# Patient Record
Sex: MALE | Race: WHITE | NOT HISPANIC OR LATINO
[De-identification: names, ages, dates, MRNs, and addresses within clinical notes are randomized per-mention and may not be internally consistent; named-entity substitution may affect disease eponyms.]

---

## 2018-05-03 ENCOUNTER — RX RENEWAL (OUTPATIENT)
Age: 63
End: 2018-05-03

## 2018-05-03 PROBLEM — Z00.00 ENCOUNTER FOR PREVENTIVE HEALTH EXAMINATION: Status: ACTIVE | Noted: 2018-05-03

## 2018-05-06 ENCOUNTER — RX RENEWAL (OUTPATIENT)
Age: 63
End: 2018-05-06

## 2018-05-14 ENCOUNTER — RECORD ABSTRACTING (OUTPATIENT)
Age: 63
End: 2018-05-14

## 2018-05-14 DIAGNOSIS — H26.9 UNSPECIFIED CATARACT: ICD-10-CM

## 2018-05-16 ENCOUNTER — APPOINTMENT (OUTPATIENT)
Dept: FAMILY MEDICINE | Facility: CLINIC | Age: 63
End: 2018-05-16
Payer: COMMERCIAL

## 2018-05-16 ENCOUNTER — LABORATORY RESULT (OUTPATIENT)
Age: 63
End: 2018-05-16

## 2018-05-16 VITALS
DIASTOLIC BLOOD PRESSURE: 72 MMHG | BODY MASS INDEX: 31.35 KG/M2 | WEIGHT: 219 LBS | OXYGEN SATURATION: 98 % | RESPIRATION RATE: 14 BRPM | HEART RATE: 78 BPM | SYSTOLIC BLOOD PRESSURE: 120 MMHG | HEIGHT: 70 IN

## 2018-05-16 LAB — PODIATRY EVAL: NORMAL

## 2018-05-16 PROCEDURE — 36415 COLL VENOUS BLD VENIPUNCTURE: CPT

## 2018-05-16 PROCEDURE — 99214 OFFICE O/P EST MOD 30 MIN: CPT | Mod: 25

## 2018-05-16 NOTE — ASSESSMENT
[FreeTextEntry1] : Medications and diet reviewed. Continue proactive approach to health.  Annual Ophthalmology exam. Follow up Podiatry. Follow up labs every three months.\par Basic cardiovascular prevention measures are advised including regular exercise, surveillance medical examination, and prudent portion-controlled low fat diet, rich in a variety of vegetables with minimal added sugars, refined starches, and no artificially hydrogenated oils.\par

## 2018-05-16 NOTE — PHYSICAL EXAM
[Normal Rhythm/Effort] : normal respiratory rhythm and effort [Clear Bilaterally] : the lungs were clear to auscultation bilaterally [Normal to Percussion] : the lungs were normal to percussion [Normal] : palpation of the chest was normal [No Acute Distress] : no acute distress [Well Nourished] : well nourished [Well Developed] : well developed [Well-Appearing] : well-appearing [Normal Sclera/Conjunctiva] : normal sclera/conjunctiva [PERRL] : pupils equal round and reactive to light [EOMI] : extraocular movements intact [Normal Outer Ear/Nose] : the outer ears and nose were normal in appearance [Normal Oropharynx] : the oropharynx was normal [No JVD] : no jugular venous distention [Supple] : supple [No Lymphadenopathy] : no lymphadenopathy [Thyroid Normal, No Nodules] : the thyroid was normal and there were no nodules present [No Respiratory Distress] : no respiratory distress  [Clear to Auscultation] : lungs were clear to auscultation bilaterally [No Accessory Muscle Use] : no accessory muscle use [Normal Rate] : normal rate  [Regular Rhythm] : with a regular rhythm [Normal S1, S2] : normal S1 and S2 [No Murmur] : no murmur heard [No Carotid Bruits] : no carotid bruits [No Abdominal Bruit] : a ~M bruit was not heard ~T in the abdomen [No Varicosities] : no varicosities [Pedal Pulses Present] : the pedal pulses are present [No Edema] : there was no peripheral edema [No Extremity Clubbing/Cyanosis] : no extremity clubbing/cyanosis [No Palpable Aorta] : no palpable aorta [Soft] : abdomen soft [Non Tender] : non-tender [Non-distended] : non-distended [No Masses] : no abdominal mass palpated [No HSM] : no HSM [Normal Bowel Sounds] : normal bowel sounds [Normal Posterior Cervical Nodes] : no posterior cervical lymphadenopathy [Normal Anterior Cervical Nodes] : no anterior cervical lymphadenopathy [No CVA Tenderness] : no CVA  tenderness [No Spinal Tenderness] : no spinal tenderness [No Joint Swelling] : no joint swelling [Grossly Normal Strength/Tone] : grossly normal strength/tone [No Rash] : no rash [Normal Gait] : normal gait [Coordination Grossly Intact] : coordination grossly intact [No Focal Deficits] : no focal deficits [Deep Tendon Reflexes (DTR)] : deep tendon reflexes were 2+ and symmetric [Normal Affect] : the affect was normal [Normal Insight/Judgement] : insight and judgment were intact

## 2018-05-16 NOTE — HISTORY OF PRESENT ILLNESS
[FreeTextEntry1] : med check [de-identified] : 61 yo wm here for follow up on diabetes, hyperlipidemia,He is compliant with diet and exercise. Unfortunately I might go through a divorce.  There is tremendous stress. Her mom was ill. I was taking care of her kids then my mother  and her mother  and then she moved out. we are both going to therapy  WE have known each other for 27 years. My therapist thinks I should move on but my wife came back.

## 2018-05-17 LAB
ALBUMIN SERPL ELPH-MCNC: 4.8 G/DL
ALP BLD-CCNC: 33 U/L
ALT SERPL-CCNC: 29 U/L
ANION GAP SERPL CALC-SCNC: 18 MMOL/L
AST SERPL-CCNC: 22 U/L
BASOPHILS # BLD AUTO: 0.09 K/UL
BASOPHILS NFR BLD AUTO: 1.2 %
BILIRUB SERPL-MCNC: 0.7 MG/DL
BUN SERPL-MCNC: 18 MG/DL
CALCIUM SERPL-MCNC: 10.5 MG/DL
CHLORIDE SERPL-SCNC: 102 MMOL/L
CHOLEST SERPL-MCNC: 176 MG/DL
CHOLEST/HDLC SERPL: 3.9 RATIO
CO2 SERPL-SCNC: 22 MMOL/L
CREAT SERPL-MCNC: 1.24 MG/DL
CREAT SPEC-SCNC: 189 MG/DL
EOSINOPHIL # BLD AUTO: 0.25 K/UL
EOSINOPHIL NFR BLD AUTO: 3.4 %
FRUCTOSAMINE SERPL-MCNC: 269 UMOL/L
GLUCOSE SERPL-MCNC: 138 MG/DL
HBA1C MFR BLD HPLC: 6.7 %
HCT VFR BLD CALC: 40.9 %
HCV AB SER QL: NONREACTIVE
HCV S/CO RATIO: 0.15 S/CO
HDLC SERPL-MCNC: 45 MG/DL
HGB BLD-MCNC: 13.4 G/DL
IMM GRANULOCYTES NFR BLD AUTO: 1.2 %
IRON SERPL-MCNC: 116 UG/DL
LDLC SERPL CALC-MCNC: 101 MG/DL
LYMPHOCYTES # BLD AUTO: 2 K/UL
LYMPHOCYTES NFR BLD AUTO: 26.8 %
MAN DIFF?: NORMAL
MCHC RBC-ENTMCNC: 30.2 PG
MCHC RBC-ENTMCNC: 32.8 GM/DL
MCV RBC AUTO: 92.3 FL
MICROALBUMIN 24H UR DL<=1MG/L-MCNC: 1.2 MG/DL
MICROALBUMIN/CREAT 24H UR-RTO: 6 MG/G
MONOCYTES # BLD AUTO: 0.54 K/UL
MONOCYTES NFR BLD AUTO: 7.2 %
NEUTROPHILS # BLD AUTO: 4.48 K/UL
NEUTROPHILS NFR BLD AUTO: 60.2 %
PLATELET # BLD AUTO: 255 K/UL
POTASSIUM SERPL-SCNC: 5.3 MMOL/L
PROT SERPL-MCNC: 8 G/DL
PSA SERPL-MCNC: 0.45 NG/ML
RBC # BLD: 4.43 M/UL
RBC # FLD: 13.7 %
SODIUM SERPL-SCNC: 142 MMOL/L
TRIGL SERPL-MCNC: 149 MG/DL
TSH SERPL-ACNC: 1.5 UIU/ML
WBC # FLD AUTO: 7.45 K/UL

## 2018-05-21 LAB — HIV1+2 AB SPEC QL IA.RAPID: NONREACTIVE

## 2018-06-04 ENCOUNTER — APPOINTMENT (OUTPATIENT)
Dept: FAMILY MEDICINE | Facility: CLINIC | Age: 63
End: 2018-06-04

## 2018-12-11 ENCOUNTER — APPOINTMENT (OUTPATIENT)
Dept: FAMILY MEDICINE | Facility: CLINIC | Age: 63
End: 2018-12-11
Payer: COMMERCIAL

## 2018-12-11 VITALS
HEART RATE: 68 BPM | SYSTOLIC BLOOD PRESSURE: 120 MMHG | DIASTOLIC BLOOD PRESSURE: 70 MMHG | RESPIRATION RATE: 12 BRPM | OXYGEN SATURATION: 98 %

## 2018-12-11 PROCEDURE — 93000 ELECTROCARDIOGRAM COMPLETE: CPT

## 2018-12-11 PROCEDURE — 99214 OFFICE O/P EST MOD 30 MIN: CPT | Mod: 25

## 2018-12-11 PROCEDURE — 36415 COLL VENOUS BLD VENIPUNCTURE: CPT

## 2018-12-11 NOTE — HEALTH RISK ASSESSMENT
[No falls in past year] : Patient reported no falls in the past year [] : No [de-identified] : n0 [de-identified] : Dr Suarez

## 2018-12-11 NOTE — HISTORY OF PRESENT ILLNESS
[FreeTextEntry8] : pt c/o right elbow +pain -injury +radiates  down arm in pinky x 1 month  My pinky is numb. I do a lot of leaning on it. It is better by Sunday and then it gets bad by the week day again. I do a lot of carrying and moving furniture.  I am moving\par pt c/o heart burn +at night  x 3 months Over the last few months i have worse heartburn it depends on what I eat. I cant have tomato the later in the day the worse it is . if I lay down it is bad. If I stand up it is better. I try zantac and it helps. I am trying to stay away from spicy but it is horrible   I have been taking advil for my elbow.

## 2018-12-11 NOTE — REVIEW OF SYSTEMS
[Fatigue] : fatigue [Heartburn] : heartburn [Joint Pain] : joint pain [Joint Stiffness] : joint stiffness [Muscle Pain] : muscle pain [Negative] : Respiratory

## 2018-12-11 NOTE — PHYSICAL EXAM
[No Acute Distress] : no acute distress [Well Nourished] : well nourished [Well Developed] : well developed [Normal Rhythm/Effort] : normal respiratory rhythm and effort [Clear Bilaterally] : the lungs were clear to auscultation bilaterally [Normal to Percussion] : the lungs were normal to percussion [Normal Rate] : normal [Normal S1] : normal S1 [Normal S2] : normal S2 [No Murmur] : no murmurs heard [No Pitting Edema] : no pitting edema present [2+] : left 2+ [No Abnormalities] : the abdominal aorta was not enlarged and no bruit was heard [Normal] : normal [Soft, Nontender] : the abdomen was soft and nontender [No Mass] : no masses were palpated [No HSM] : no hepatosplenomegaly noted [S3] : no S3 [S4] : no S4 [Right Carotid Bruit] : no bruit heard over the right carotid [Left Carotid Bruit] : no bruit heard over the left carotid [Right Femoral Bruit] : no bruit heard over the right femoral artery [Left Femoral Bruit] : no bruit heard over the left femoral artery [de-identified] : right medial epicondyle tender forearm tender along ulnar nerve burning

## 2018-12-11 NOTE — ASSESSMENT
[FreeTextEntry1] : check ekg\par trial protonix for gerd\par endoscopy if no better\par avoid irritating foods,etoh, caffeine. advil\par \par check xray elbow and then MRI right elbow ro nerve entrapment \par he may need orthopedic follow up\par continue to wear brace and trial prednisone for inflammation take with food. ice on and off for 20 minutes. Look at desk at work and take the pressure off his elbow and forearm\par Labs drawn/ specimens obtained  in office on this date of service  for evaluation of   assessed conditions - hgba1c     to be run at [facility].\par

## 2018-12-13 LAB
ALBUMIN SERPL ELPH-MCNC: 4.8 G/DL
ALP BLD-CCNC: 36 U/L
ALT SERPL-CCNC: 33 U/L
ANION GAP SERPL CALC-SCNC: 9 MMOL/L
AST SERPL-CCNC: 27 U/L
BILIRUB SERPL-MCNC: 0.4 MG/DL
BUN SERPL-MCNC: 16 MG/DL
CALCIUM SERPL-MCNC: 10.1 MG/DL
CHLORIDE SERPL-SCNC: 103 MMOL/L
CO2 SERPL-SCNC: 25 MMOL/L
CREAT SERPL-MCNC: 0.96 MG/DL
GLUCOSE SERPL-MCNC: 86 MG/DL
HBA1C MFR BLD HPLC: 6.8 %
POTASSIUM SERPL-SCNC: 4.7 MMOL/L
PROT SERPL-MCNC: 7.8 G/DL
SODIUM SERPL-SCNC: 137 MMOL/L

## 2019-01-18 ENCOUNTER — RX RENEWAL (OUTPATIENT)
Age: 64
End: 2019-01-18

## 2019-04-02 ENCOUNTER — RX RENEWAL (OUTPATIENT)
Age: 64
End: 2019-04-02

## 2019-06-13 ENCOUNTER — RX RENEWAL (OUTPATIENT)
Age: 64
End: 2019-06-13

## 2019-06-19 ENCOUNTER — RX RENEWAL (OUTPATIENT)
Age: 64
End: 2019-06-19

## 2019-06-22 ENCOUNTER — RX RENEWAL (OUTPATIENT)
Age: 64
End: 2019-06-22

## 2019-06-28 ENCOUNTER — RX RENEWAL (OUTPATIENT)
Age: 64
End: 2019-06-28

## 2019-08-25 ENCOUNTER — RX RENEWAL (OUTPATIENT)
Age: 64
End: 2019-08-25

## 2019-08-30 ENCOUNTER — RX RENEWAL (OUTPATIENT)
Age: 64
End: 2019-08-30

## 2019-09-02 ENCOUNTER — RX RENEWAL (OUTPATIENT)
Age: 64
End: 2019-09-02

## 2019-09-08 ENCOUNTER — RX RENEWAL (OUTPATIENT)
Age: 64
End: 2019-09-08

## 2019-09-11 ENCOUNTER — MEDICATION RENEWAL (OUTPATIENT)
Age: 64
End: 2019-09-11

## 2019-09-30 ENCOUNTER — APPOINTMENT (OUTPATIENT)
Dept: FAMILY MEDICINE | Facility: CLINIC | Age: 64
End: 2019-09-30
Payer: COMMERCIAL

## 2019-09-30 VITALS
HEIGHT: 70 IN | HEART RATE: 78 BPM | BODY MASS INDEX: 31.5 KG/M2 | OXYGEN SATURATION: 98 % | DIASTOLIC BLOOD PRESSURE: 70 MMHG | WEIGHT: 220 LBS | RESPIRATION RATE: 14 BRPM | SYSTOLIC BLOOD PRESSURE: 120 MMHG

## 2019-09-30 DIAGNOSIS — M25.521 PAIN IN RIGHT ELBOW: ICD-10-CM

## 2019-09-30 PROCEDURE — 99213 OFFICE O/P EST LOW 20 MIN: CPT

## 2019-09-30 RX ORDER — PREDNISONE 20 MG/1
20 TABLET ORAL
Qty: 16 | Refills: 0 | Status: COMPLETED | COMMUNITY
Start: 2018-12-11 | End: 2019-09-30

## 2019-09-30 NOTE — PHYSICAL EXAM
[Normal] : normal rate, regular rhythm, normal S1 and S2 and no murmur heard [Comprehensive Foot Exam Normal] : Right and left foot were examined and both feet are normal. No ulcers in either foot. Toes are normal and with full ROM.  Normal tactile sensation with monofilament testing throughout both feet [de-identified] : right eye small bubble in lower lid

## 2019-09-30 NOTE — ASSESSMENT
[FreeTextEntry1] :  apply warm tea bag to lower eye lid. follow up with eye dr if no better\par follow lab slip given to be done for full blood work at outside lab cbc cmp lipid tsh hgba1c psa\par Basic cardiovascular prevention measures are advised including regular exercise, surveillance medical examination, and prudent portion-controlled low fat diet, rich in a variety of vegetables with minimal added sugars, refined starches, and no artificially hydrogenated oils.\par  \par refused pneumovax and flu shot\par We spent sufficient time to discuss aspects of care; questions were answered  to patient's satisfaction.The diagnosis and care plan were discussed with patient in detail.  Patient test results were  reviewed and explained in full. All questions and concerns  were answered to the best of my knowledge.\par

## 2019-09-30 NOTE — HISTORY OF PRESENT ILLNESS
[FreeTextEntry1] : Patient present for med check\par  [de-identified] : 63 yo wm here for follow up on diabetes hypertension. Otherwise patient reports feeling well.  Patient specifically denies chest pain, shortness of breath, dyspnea on exertion, edema, PND, orthopnea, dizziness, or syncope. Patient reports compliance with medications. Patient denies fever, chills, night sweats, nausea, vomiting , no pain, erythema, swollen joints, hematuria, hematochezia , hematemesis, or melena.\par   He did have endoscopy and he is on meds - pantoprazole  for 2 months.  He will be weaning. \par \par He has an excess of meds he doesn’t need refills.

## 2019-09-30 NOTE — HEALTH RISK ASSESSMENT
[No] : In the past 12 months have you used drugs other than those required for medical reasons? No [No falls in past year] : Patient reported no falls in the past year [2 - 4 times a month (2 pts)] : 2-4 times a month (2 points) [1 or 2 (0 pts)] : 1 or 2 (0 points) [0] : 2) Feeling down, depressed, or hopeless: Not at all (0) [] : No [de-identified] : eye  [de-identified] : no [Audit-CScore] : 2 [de-identified] : diabetic [[id number]] : 0

## 2019-10-02 ENCOUNTER — APPOINTMENT (OUTPATIENT)
Dept: FAMILY MEDICINE | Facility: CLINIC | Age: 64
End: 2019-10-02
Payer: COMMERCIAL

## 2019-10-02 DIAGNOSIS — Z63.0 PROBLEMS IN RELATIONSHIP WITH SPOUSE OR PARTNER: ICD-10-CM

## 2019-10-02 PROCEDURE — 36415 COLL VENOUS BLD VENIPUNCTURE: CPT

## 2019-10-02 PROCEDURE — XXXXX: CPT

## 2019-10-02 SDOH — SOCIAL STABILITY - SOCIAL INSECURITY: PROBLEMS IN RELATIONSHIP WITH SPOUSE OR PARTNER: Z63.0

## 2019-10-03 LAB
ALBUMIN SERPL ELPH-MCNC: 4.7 G/DL
ALP BLD-CCNC: 34 U/L
ALT SERPL-CCNC: 27 U/L
ANION GAP SERPL CALC-SCNC: 9 MMOL/L
AST SERPL-CCNC: 25 U/L
BASOPHILS # BLD AUTO: 0.07 K/UL
BASOPHILS NFR BLD AUTO: 1.2 %
BILIRUB SERPL-MCNC: 0.4 MG/DL
BUN SERPL-MCNC: 19 MG/DL
CALCIUM SERPL-MCNC: 9.2 MG/DL
CHLORIDE SERPL-SCNC: 104 MMOL/L
CHOLEST SERPL-MCNC: 157 MG/DL
CHOLEST/HDLC SERPL: 4.6 RATIO
CO2 SERPL-SCNC: 23 MMOL/L
CREAT SERPL-MCNC: 0.99 MG/DL
CREAT SPEC-SCNC: 175 MG/DL
EOSINOPHIL # BLD AUTO: 0.32 K/UL
EOSINOPHIL NFR BLD AUTO: 5.7 %
ESTIMATED AVERAGE GLUCOSE: 157 MG/DL
FERRITIN SERPL-MCNC: 58 NG/ML
GLUCOSE SERPL-MCNC: 167 MG/DL
HBA1C MFR BLD HPLC: 7.1 %
HCT VFR BLD CALC: 38.6 %
HDLC SERPL-MCNC: 34 MG/DL
HGB BLD-MCNC: 12.4 G/DL
IMM GRANULOCYTES NFR BLD AUTO: 0.2 %
IRON SATN MFR SERPL: 18 %
IRON SERPL-MCNC: 68 UG/DL
LDLC SERPL CALC-MCNC: 95 MG/DL
LYMPHOCYTES # BLD AUTO: 1.73 K/UL
LYMPHOCYTES NFR BLD AUTO: 30.6 %
MAN DIFF?: NORMAL
MCHC RBC-ENTMCNC: 29.9 PG
MCHC RBC-ENTMCNC: 32.1 GM/DL
MCV RBC AUTO: 93 FL
MICROALBUMIN 24H UR DL<=1MG/L-MCNC: 2.4 MG/DL
MICROALBUMIN/CREAT 24H UR-RTO: 14 MG/G
MONOCYTES # BLD AUTO: 0.53 K/UL
MONOCYTES NFR BLD AUTO: 9.4 %
NEUTROPHILS # BLD AUTO: 2.99 K/UL
NEUTROPHILS NFR BLD AUTO: 52.9 %
PLATELET # BLD AUTO: 203 K/UL
POTASSIUM SERPL-SCNC: 5.2 MMOL/L
PROT SERPL-MCNC: 7 G/DL
PSA SERPL-MCNC: 0.34 NG/ML
RBC # BLD: 4.15 M/UL
RBC # FLD: 12.8 %
SODIUM SERPL-SCNC: 136 MMOL/L
TIBC SERPL-MCNC: 371 UG/DL
TRIGL SERPL-MCNC: 139 MG/DL
TSH SERPL-ACNC: 1.63 UIU/ML
UIBC SERPL-MCNC: 303 UG/DL
WBC # FLD AUTO: 5.65 K/UL

## 2019-10-20 ENCOUNTER — RX RENEWAL (OUTPATIENT)
Age: 64
End: 2019-10-20

## 2019-10-25 ENCOUNTER — RX RENEWAL (OUTPATIENT)
Age: 64
End: 2019-10-25

## 2019-10-28 ENCOUNTER — RX RENEWAL (OUTPATIENT)
Age: 64
End: 2019-10-28

## 2019-11-06 ENCOUNTER — RX RENEWAL (OUTPATIENT)
Age: 64
End: 2019-11-06

## 2019-11-07 ENCOUNTER — RX RENEWAL (OUTPATIENT)
Age: 64
End: 2019-11-07

## 2020-01-13 ENCOUNTER — RX RENEWAL (OUTPATIENT)
Age: 65
End: 2020-01-13

## 2020-01-18 ENCOUNTER — RX RENEWAL (OUTPATIENT)
Age: 65
End: 2020-01-18

## 2020-01-21 ENCOUNTER — RX RENEWAL (OUTPATIENT)
Age: 65
End: 2020-01-21

## 2020-01-30 ENCOUNTER — RX RENEWAL (OUTPATIENT)
Age: 65
End: 2020-01-30

## 2020-05-27 ENCOUNTER — RX RENEWAL (OUTPATIENT)
Age: 65
End: 2020-05-27

## 2020-05-27 ENCOUNTER — NON-APPOINTMENT (OUTPATIENT)
Age: 65
End: 2020-05-27

## 2020-06-08 ENCOUNTER — APPOINTMENT (OUTPATIENT)
Dept: FAMILY MEDICINE | Facility: CLINIC | Age: 65
End: 2020-06-08
Payer: COMMERCIAL

## 2020-06-08 VITALS
BODY MASS INDEX: 31.21 KG/M2 | DIASTOLIC BLOOD PRESSURE: 70 MMHG | RESPIRATION RATE: 12 BRPM | WEIGHT: 218 LBS | HEART RATE: 67 BPM | OXYGEN SATURATION: 97 % | HEIGHT: 70 IN | SYSTOLIC BLOOD PRESSURE: 120 MMHG

## 2020-06-08 DIAGNOSIS — Z12.5 ENCOUNTER FOR SCREENING FOR MALIGNANT NEOPLASM OF PROSTATE: ICD-10-CM

## 2020-06-08 DIAGNOSIS — Z11.9 ENCOUNTER FOR SCREENING FOR INFECTIOUS AND PARASITIC DISEASES, UNSPECIFIED: ICD-10-CM

## 2020-06-08 PROCEDURE — 36415 COLL VENOUS BLD VENIPUNCTURE: CPT

## 2020-06-08 PROCEDURE — 99213 OFFICE O/P EST LOW 20 MIN: CPT | Mod: 25

## 2020-06-08 RX ORDER — PANTOPRAZOLE 40 MG/1
40 TABLET, DELAYED RELEASE ORAL
Qty: 30 | Refills: 1 | Status: DISCONTINUED | COMMUNITY
Start: 2018-12-11 | End: 2020-06-08

## 2020-06-08 NOTE — ASSESSMENT
[FreeTextEntry1] : Basic cardiovascular prevention measures are advised including regular exercise, surveillance medical examination, and prudent portion-controlled low fat diet, rich in a variety of vegetables with minimal added sugars, refined starches, and no artificially hydrogenated oils.\par Labs drawn/ specimens obtained  in office on this date of service  for evaluation of   assessed conditions -    diabetes  to be run at [facility].\par

## 2020-06-08 NOTE — HISTORY OF PRESENT ILLNESS
[FreeTextEntry1] : pt presents for diabetes follow up  [de-identified] : 63 yo wm here for follow up on diabetes. 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 It is  not busy.  We are going to have to lay people off. We are doing service for the bare minimum. \par \par Otherwise patient reports feeling well.  Patient specifically denies chest pain, shortness of breath, dyspnea on exertion, edema, PND, orthopnea, dizziness, or syncope. Patient reports compliance with medications. Patient denies fever, chills, night sweats, nausea, vomiting , no pain, erythema, swollen joints, hematuria, hematochezia , hematemesis, or melena.\par

## 2020-06-09 LAB
ALBUMIN SERPL ELPH-MCNC: 5 G/DL
ALP BLD-CCNC: 40 U/L
ALT SERPL-CCNC: 25 U/L
ANION GAP SERPL CALC-SCNC: 16 MMOL/L
AST SERPL-CCNC: 23 U/L
BASOPHILS # BLD AUTO: 0.08 K/UL
BASOPHILS NFR BLD AUTO: 1.2 %
BILIRUB SERPL-MCNC: 0.5 MG/DL
BUN SERPL-MCNC: 11 MG/DL
CALCIUM SERPL-MCNC: 9.6 MG/DL
CHLORIDE SERPL-SCNC: 102 MMOL/L
CHOLEST SERPL-MCNC: 166 MG/DL
CHOLEST/HDLC SERPL: 4.6 RATIO
CO2 SERPL-SCNC: 22 MMOL/L
CREAT SERPL-MCNC: 1.07 MG/DL
EOSINOPHIL # BLD AUTO: 0.21 K/UL
EOSINOPHIL NFR BLD AUTO: 3.2 %
ESTIMATED AVERAGE GLUCOSE: 163 MG/DL
GLUCOSE SERPL-MCNC: 87 MG/DL
HBA1C MFR BLD HPLC: 7.3 %
HCT VFR BLD CALC: 38.9 %
HDLC SERPL-MCNC: 36 MG/DL
HGB BLD-MCNC: 12.5 G/DL
IMM GRANULOCYTES NFR BLD AUTO: 0.3 %
LDLC SERPL CALC-MCNC: 95 MG/DL
LYMPHOCYTES # BLD AUTO: 2.06 K/UL
LYMPHOCYTES NFR BLD AUTO: 31.1 %
MAN DIFF?: NORMAL
MCHC RBC-ENTMCNC: 29.8 PG
MCHC RBC-ENTMCNC: 32.1 GM/DL
MCV RBC AUTO: 92.6 FL
MONOCYTES # BLD AUTO: 0.5 K/UL
MONOCYTES NFR BLD AUTO: 7.5 %
NEUTROPHILS # BLD AUTO: 3.76 K/UL
NEUTROPHILS NFR BLD AUTO: 56.7 %
PLATELET # BLD AUTO: 229 K/UL
POTASSIUM SERPL-SCNC: 4.6 MMOL/L
PROT SERPL-MCNC: 7.8 G/DL
PSA SERPL-MCNC: 0.36 NG/ML
RBC # BLD: 4.2 M/UL
RBC # FLD: 12.6 %
SARS-COV-2 IGG SERPL IA-ACNC: <0.1 INDEX
SARS-COV-2 IGG SERPL QL IA: NEGATIVE
SODIUM SERPL-SCNC: 140 MMOL/L
TRIGL SERPL-MCNC: 170 MG/DL
WBC # FLD AUTO: 6.63 K/UL

## 2020-06-10 ENCOUNTER — TRANSCRIPTION ENCOUNTER (OUTPATIENT)
Age: 65
End: 2020-06-10

## 2020-06-10 LAB
HCV AB SER QL: NONREACTIVE
HCV S/CO RATIO: 0.13 S/CO

## 2020-09-05 ENCOUNTER — RX RENEWAL (OUTPATIENT)
Age: 65
End: 2020-09-05

## 2021-04-04 ENCOUNTER — RX RENEWAL (OUTPATIENT)
Age: 66
End: 2021-04-04

## 2021-05-12 ENCOUNTER — NON-APPOINTMENT (OUTPATIENT)
Age: 66
End: 2021-05-12

## 2021-05-12 ENCOUNTER — APPOINTMENT (OUTPATIENT)
Dept: FAMILY MEDICINE | Facility: CLINIC | Age: 66
End: 2021-05-12
Payer: MEDICARE

## 2021-05-12 VITALS
HEART RATE: 71 BPM | HEIGHT: 70 IN | BODY MASS INDEX: 31.21 KG/M2 | DIASTOLIC BLOOD PRESSURE: 78 MMHG | TEMPERATURE: 97.2 F | SYSTOLIC BLOOD PRESSURE: 130 MMHG | RESPIRATION RATE: 13 BRPM | WEIGHT: 218 LBS | OXYGEN SATURATION: 98 %

## 2021-05-12 PROCEDURE — 36415 COLL VENOUS BLD VENIPUNCTURE: CPT

## 2021-05-12 PROCEDURE — 99214 OFFICE O/P EST MOD 30 MIN: CPT | Mod: 25

## 2021-05-12 PROCEDURE — 93000 ELECTROCARDIOGRAM COMPLETE: CPT

## 2021-05-12 PROCEDURE — 83036 HEMOGLOBIN GLYCOSYLATED A1C: CPT | Mod: QW

## 2021-05-12 NOTE — ASSESSMENT
[FreeTextEntry1] : Basic cardiovascular prevention measures are advised including regular exercise, surveillance medical examination, and prudent portion-controlled low fat diet, rich in a variety of vegetables with minimal added sugars, refined starches, and no artificially hydrogenated oils.\par Discussion and interpretation of previous tests , external notes( labs, radiology, specialist  , patient verbalized understanding.\par Prescription drug management\par renew all meds. \par Labs to be drawn/ specimens obtained  at outside  lab    for evaluation of   assessed conditions - cbc cmp lipid      for and screening purposes.\par poct hgba1c 6.9 - advised to improve diet reduce  carbs. \par \par EKG performed on this day in the office and reviewed by PORSHA Sierra. It is showing pvc. follow up cardiology.\par eliminate etoh. drink more milk for his gerd symptoms\par

## 2021-05-12 NOTE — HISTORY OF PRESENT ILLNESS
[FreeTextEntry1] : pt presents for f/u med check  [de-identified] : 64 yo  wm here for follow up  He sold his house. He is having one built in [facility].  3100 sq feet for 375,000\par Otherwise patient reports feeling well.  Patient specifically denies chest pain, shortness of breath, dyspnea on exertion, edema, PND, orthopnea, dizziness, or syncope. Patient reports compliance with medications. Patient denies fever, chills, night sweats, nausea, vomiting , no pain, erythema, swollen joints, hematuria, hematochezia , hematemesis, or melena.\par \par He has some reflux. He had endoscopy. He took ppi and it didn’t help. He changed his diet and He still has it.He goes to bed at 10 pm and 12 am every night he gets heartburn. He is concerned. \par

## 2021-05-13 ENCOUNTER — TRANSCRIPTION ENCOUNTER (OUTPATIENT)
Age: 66
End: 2021-05-13

## 2021-05-13 ENCOUNTER — APPOINTMENT (OUTPATIENT)
Dept: CARDIOLOGY | Facility: CLINIC | Age: 66
End: 2021-05-13
Payer: MEDICARE

## 2021-05-13 ENCOUNTER — NON-APPOINTMENT (OUTPATIENT)
Age: 66
End: 2021-05-13

## 2021-05-13 VITALS
DIASTOLIC BLOOD PRESSURE: 90 MMHG | HEART RATE: 85 BPM | BODY MASS INDEX: 31.5 KG/M2 | HEIGHT: 70 IN | WEIGHT: 220 LBS | OXYGEN SATURATION: 97 % | SYSTOLIC BLOOD PRESSURE: 130 MMHG

## 2021-05-13 DIAGNOSIS — R12 HEARTBURN: ICD-10-CM

## 2021-05-13 LAB
24R-OH-CALCIDIOL SERPL-MCNC: 52.9 PG/ML
ALBUMIN SERPL ELPH-MCNC: 5 G/DL
ALP BLD-CCNC: 40 U/L
ALT SERPL-CCNC: 33 U/L
ANION GAP SERPL CALC-SCNC: 11 MMOL/L
AST SERPL-CCNC: 29 U/L
BASOPHILS # BLD AUTO: 0.08 K/UL
BASOPHILS NFR BLD AUTO: 1.2 %
BILIRUB SERPL-MCNC: 0.4 MG/DL
BUN SERPL-MCNC: 13 MG/DL
CALCIUM SERPL-MCNC: 10.3 MG/DL
CHLORIDE SERPL-SCNC: 103 MMOL/L
CHOLEST SERPL-MCNC: 203 MG/DL
CO2 SERPL-SCNC: 26 MMOL/L
CREAT SERPL-MCNC: 1.04 MG/DL
CREAT SPEC-SCNC: 57 MG/DL
EOSINOPHIL # BLD AUTO: 0.42 K/UL
EOSINOPHIL NFR BLD AUTO: 6.1 %
FERRITIN SERPL-MCNC: 96 NG/ML
FOLATE SERPL-MCNC: >20 NG/ML
GLUCOSE SERPL-MCNC: 85 MG/DL
HCT VFR BLD CALC: 38.9 %
HDLC SERPL-MCNC: 36 MG/DL
HGB BLD-MCNC: 12.7 G/DL
IMM GRANULOCYTES NFR BLD AUTO: 0.3 %
IRON SATN MFR SERPL: 18 %
IRON SERPL-MCNC: 70 UG/DL
LDLC SERPL CALC-MCNC: 113 MG/DL
LYMPHOCYTES # BLD AUTO: 2.36 K/UL
LYMPHOCYTES NFR BLD AUTO: 34.6 %
MAGNESIUM SERPL-MCNC: 2.4 MG/DL
MAN DIFF?: NORMAL
MCHC RBC-ENTMCNC: 30.2 PG
MCHC RBC-ENTMCNC: 32.6 GM/DL
MCV RBC AUTO: 92.6 FL
MICROALBUMIN 24H UR DL<=1MG/L-MCNC: <1.2 MG/DL
MICROALBUMIN/CREAT 24H UR-RTO: NORMAL MG/G
MONOCYTES # BLD AUTO: 0.48 K/UL
MONOCYTES NFR BLD AUTO: 7 %
NEUTROPHILS # BLD AUTO: 3.47 K/UL
NEUTROPHILS NFR BLD AUTO: 50.8 %
NONHDLC SERPL-MCNC: 167 MG/DL
PLATELET # BLD AUTO: 242 K/UL
POTASSIUM SERPL-SCNC: 5.2 MMOL/L
PROT SERPL-MCNC: 7.7 G/DL
PSA SERPL-MCNC: 0.37 NG/ML
RBC # BLD: 4.2 M/UL
RBC # FLD: 12.6 %
SODIUM SERPL-SCNC: 140 MMOL/L
T3 SERPL-MCNC: 106 NG/DL
T3FREE SERPL-MCNC: 3.05 PG/ML
T4 FREE SERPL-MCNC: 1 NG/DL
TIBC SERPL-MCNC: 387 UG/DL
TRIGL SERPL-MCNC: 269 MG/DL
TSH SERPL-ACNC: 1.62 UIU/ML
UIBC SERPL-MCNC: 316 UG/DL
VIT B12 SERPL-MCNC: 450 PG/ML
WBC # FLD AUTO: 6.83 K/UL

## 2021-05-13 PROCEDURE — 93000 ELECTROCARDIOGRAM COMPLETE: CPT

## 2021-05-13 PROCEDURE — 99204 OFFICE O/P NEW MOD 45 MIN: CPT | Mod: 25

## 2021-06-01 ENCOUNTER — NON-APPOINTMENT (OUTPATIENT)
Age: 66
End: 2021-06-01

## 2021-06-01 PROBLEM — R12 HEARTBURN: Status: ACTIVE | Noted: 2018-12-11

## 2021-06-01 NOTE — HISTORY OF PRESENT ILLNESS
[FreeTextEntry1] : Pt is a 66 y/o M who is referred here today by their PCP for evaluation abnormal ECG - NSR PVCs.  He has PMH HTN, HLD, DM.   He also has history of GERD which will wake him up at night.  He denies CP, SOB, diaphoresis, palpitations, dizziness, syncope, LE edema, PND, orthopnea. \par COVID vaccine 04/2021 Pfizer\par \par PMH: HTN, HLD, DM, GERD mostly diet controlled, mild hiatal hernia\par Family hx: father DM/"heart issues", mother CVA 90\par Smoking status: never\par social ETOH\par no drug use\par Current exercise: none\par Daily water intake: 2 glasses\par Daily caffeine intake: 2 cups coffee\par OTC medications: aleve/tylenol PRN\par NKDA\par Previous cardiac testing: echo many yrs ago \par Previous hospitalizations: none\par

## 2021-06-01 NOTE — DISCUSSION/SUMMARY
[FreeTextEntry1] : Pt is a 66 y/o M who is referred here today by their PCP for evaluation abnormal ECG - NSR PVCs.  He has PMH HTN, HLD, DM.   He also has history of GERD which will wake him up at night. \par Will check transthoracic echocardiogram to evaluate left ventricular function and assess for any structural abnormalities\par check nuclear stress test to eval for ischemia\par check dawson monitor\par \par HTN:\par well controlled\par c/w lininopril\par Advised low salt diet, regular exercise, weight loss \par \par HLD:\par c/w statin\par goal LDL <70\par Advised lifestyle modifications \par \par DM:\par follows with PCP\par c/w current meds\par goal A1c <7\par Advised lifestyle modifications \par \par The described plan was discussed with the pt.  All questions and concerns were addressed to the best of my knowledge.

## 2021-07-06 ENCOUNTER — APPOINTMENT (OUTPATIENT)
Dept: CARDIOLOGY | Facility: CLINIC | Age: 66
End: 2021-07-06
Payer: MEDICARE

## 2021-07-06 PROCEDURE — ZZZZZ: CPT

## 2021-07-06 PROCEDURE — 93306 TTE W/DOPPLER COMPLETE: CPT

## 2021-07-12 ENCOUNTER — APPOINTMENT (OUTPATIENT)
Dept: CARDIOLOGY | Facility: CLINIC | Age: 66
End: 2021-07-12
Payer: MEDICARE

## 2021-07-12 PROCEDURE — 93018 CV STRESS TEST I&R ONLY: CPT

## 2021-07-12 PROCEDURE — 78452 HT MUSCLE IMAGE SPECT MULT: CPT

## 2021-07-12 PROCEDURE — A9500: CPT

## 2021-07-12 PROCEDURE — 93016 CV STRESS TEST SUPVJ ONLY: CPT

## 2021-07-12 PROCEDURE — 93017 CV STRESS TEST TRACING ONLY: CPT

## 2021-07-15 ENCOUNTER — TRANSCRIPTION ENCOUNTER (OUTPATIENT)
Age: 66
End: 2021-07-15

## 2021-07-21 ENCOUNTER — NON-APPOINTMENT (OUTPATIENT)
Age: 66
End: 2021-07-21

## 2021-07-22 ENCOUNTER — NON-APPOINTMENT (OUTPATIENT)
Age: 66
End: 2021-07-22

## 2021-07-30 PROCEDURE — 93224 XTRNL ECG REC UP TO 48 HRS: CPT

## 2021-08-01 ENCOUNTER — FORM ENCOUNTER (OUTPATIENT)
Age: 66
End: 2021-08-01

## 2021-08-02 ENCOUNTER — TRANSCRIPTION ENCOUNTER (OUTPATIENT)
Age: 66
End: 2021-08-02

## 2021-08-02 ENCOUNTER — RESULT REVIEW (OUTPATIENT)
Age: 66
End: 2021-08-02

## 2021-08-02 ENCOUNTER — OUTPATIENT (OUTPATIENT)
Dept: OUTPATIENT SERVICES | Facility: HOSPITAL | Age: 66
LOS: 1 days | End: 2021-08-02
Payer: MEDICARE

## 2021-08-02 VITALS
TEMPERATURE: 98 F | SYSTOLIC BLOOD PRESSURE: 183 MMHG | RESPIRATION RATE: 18 BRPM | HEART RATE: 77 BPM | DIASTOLIC BLOOD PRESSURE: 91 MMHG | HEIGHT: 70 IN | WEIGHT: 220.46 LBS | OXYGEN SATURATION: 99 %

## 2021-08-02 VITALS — RESPIRATION RATE: 18 BRPM | DIASTOLIC BLOOD PRESSURE: 69 MMHG | HEART RATE: 67 BPM | SYSTOLIC BLOOD PRESSURE: 152 MMHG

## 2021-08-02 DIAGNOSIS — Z98.49 CATARACT EXTRACTION STATUS, UNSPECIFIED EYE: Chronic | ICD-10-CM

## 2021-08-02 DIAGNOSIS — R94.39 ABNORMAL RESULT OF OTHER CARDIOVASCULAR FUNCTION STUDY: ICD-10-CM

## 2021-08-02 LAB
ALBUMIN SERPL ELPH-MCNC: 4.6 G/DL — SIGNIFICANT CHANGE UP (ref 3.3–5.2)
ALP SERPL-CCNC: 38 U/L — LOW (ref 40–120)
ALT FLD-CCNC: 25 U/L — SIGNIFICANT CHANGE UP
ANION GAP SERPL CALC-SCNC: 12 MMOL/L — SIGNIFICANT CHANGE UP (ref 5–17)
AST SERPL-CCNC: 24 U/L — SIGNIFICANT CHANGE UP
BASOPHILS # BLD AUTO: 0.07 K/UL — SIGNIFICANT CHANGE UP (ref 0–0.2)
BASOPHILS NFR BLD AUTO: 1 % — SIGNIFICANT CHANGE UP (ref 0–2)
BILIRUB SERPL-MCNC: 0.5 MG/DL — SIGNIFICANT CHANGE UP (ref 0.4–2)
BUN SERPL-MCNC: 11.2 MG/DL — SIGNIFICANT CHANGE UP (ref 8–20)
CALCIUM SERPL-MCNC: 9.5 MG/DL — SIGNIFICANT CHANGE UP (ref 8.6–10.2)
CHLORIDE SERPL-SCNC: 104 MMOL/L — SIGNIFICANT CHANGE UP (ref 98–107)
CO2 SERPL-SCNC: 23 MMOL/L — SIGNIFICANT CHANGE UP (ref 22–29)
CREAT SERPL-MCNC: 1.06 MG/DL — SIGNIFICANT CHANGE UP (ref 0.5–1.3)
EOSINOPHIL # BLD AUTO: 0.32 K/UL — SIGNIFICANT CHANGE UP (ref 0–0.5)
EOSINOPHIL NFR BLD AUTO: 4.7 % — SIGNIFICANT CHANGE UP (ref 0–6)
GLUCOSE SERPL-MCNC: 128 MG/DL — HIGH (ref 70–99)
HCT VFR BLD CALC: 37.9 % — LOW (ref 39–50)
HGB BLD-MCNC: 12.3 G/DL — LOW (ref 13–17)
IMM GRANULOCYTES NFR BLD AUTO: 0.3 % — SIGNIFICANT CHANGE UP (ref 0–1.5)
LYMPHOCYTES # BLD AUTO: 2.14 K/UL — SIGNIFICANT CHANGE UP (ref 1–3.3)
LYMPHOCYTES # BLD AUTO: 31.4 % — SIGNIFICANT CHANGE UP (ref 13–44)
MAGNESIUM SERPL-MCNC: 2 MG/DL — SIGNIFICANT CHANGE UP (ref 1.6–2.6)
MCHC RBC-ENTMCNC: 29.9 PG — SIGNIFICANT CHANGE UP (ref 27–34)
MCHC RBC-ENTMCNC: 32.5 GM/DL — SIGNIFICANT CHANGE UP (ref 32–36)
MCV RBC AUTO: 92.2 FL — SIGNIFICANT CHANGE UP (ref 80–100)
MONOCYTES # BLD AUTO: 0.57 K/UL — SIGNIFICANT CHANGE UP (ref 0–0.9)
MONOCYTES NFR BLD AUTO: 8.4 % — SIGNIFICANT CHANGE UP (ref 2–14)
NEUTROPHILS # BLD AUTO: 3.69 K/UL — SIGNIFICANT CHANGE UP (ref 1.8–7.4)
NEUTROPHILS NFR BLD AUTO: 54.2 % — SIGNIFICANT CHANGE UP (ref 43–77)
PLATELET # BLD AUTO: 222 K/UL — SIGNIFICANT CHANGE UP (ref 150–400)
POTASSIUM SERPL-MCNC: 4.6 MMOL/L — SIGNIFICANT CHANGE UP (ref 3.5–5.3)
POTASSIUM SERPL-SCNC: 4.6 MMOL/L — SIGNIFICANT CHANGE UP (ref 3.5–5.3)
PROT SERPL-MCNC: 7.5 G/DL — SIGNIFICANT CHANGE UP (ref 6.6–8.7)
RBC # BLD: 4.11 M/UL — LOW (ref 4.2–5.8)
RBC # FLD: 12.5 % — SIGNIFICANT CHANGE UP (ref 10.3–14.5)
SODIUM SERPL-SCNC: 139 MMOL/L — SIGNIFICANT CHANGE UP (ref 135–145)
WBC # BLD: 6.81 K/UL — SIGNIFICANT CHANGE UP (ref 3.8–10.5)
WBC # FLD AUTO: 6.81 K/UL — SIGNIFICANT CHANGE UP (ref 3.8–10.5)

## 2021-08-02 PROCEDURE — 99152 MOD SED SAME PHYS/QHP 5/>YRS: CPT

## 2021-08-02 PROCEDURE — 71045 X-RAY EXAM CHEST 1 VIEW: CPT | Mod: 26

## 2021-08-02 PROCEDURE — 93010 ELECTROCARDIOGRAM REPORT: CPT

## 2021-08-02 PROCEDURE — 93454 CORONARY ARTERY ANGIO S&I: CPT | Mod: 26

## 2021-08-02 PROCEDURE — 93306 TTE W/DOPPLER COMPLETE: CPT | Mod: 26

## 2021-08-02 PROCEDURE — 93880 EXTRACRANIAL BILAT STUDY: CPT | Mod: 26

## 2021-08-02 RX ORDER — ROSUVASTATIN CALCIUM 5 MG/1
1 TABLET ORAL
Qty: 30 | Refills: 0
Start: 2021-08-02 | End: 2021-08-31

## 2021-08-02 RX ORDER — ROSUVASTATIN CALCIUM 5 MG/1
1 TABLET ORAL
Qty: 0 | Refills: 0 | DISCHARGE

## 2021-08-02 RX ORDER — METOPROLOL TARTRATE 50 MG
1 TABLET ORAL
Qty: 30 | Refills: 0
Start: 2021-08-02 | End: 2021-08-31

## 2021-08-02 RX ORDER — METFORMIN HYDROCHLORIDE 850 MG/1
2 TABLET ORAL
Qty: 0 | Refills: 0 | DISCHARGE

## 2021-08-02 NOTE — H&P PST ADULT - NSICDXFAMILYHX_GEN_ALL_CORE_FT
FAMILY HISTORY:  Father  Still living? Unknown  Family history of cardiac disorder, Age at diagnosis: Age Unknown  Family history of diabetes mellitus, Age at diagnosis: Age Unknown    Mother  Still living? Unknown  Family history of cerebrovascular accident (CVA), Age at diagnosis: 81-90

## 2021-08-02 NOTE — DISCHARGE NOTE PROVIDER - PROVIDER TOKENS
PROVIDER:[TOKEN:[[id number]],FOLLOWUP:[2 weeks],ESTABLISHEDPATIENT:[T]] PROVIDER:[TOKEN:[[id number]],FOLLOWUP:[2 weeks],ESTABLISHEDPATIENT:[T]],PROVIDER:[[id number]],SCHEDULEDAPPT:[08/03/2021],SCHEDULEDAPPTTIME:[02:15 PM],ESTABLISHEDPATIENT:[T]]

## 2021-08-02 NOTE — H&P PST ADULT - NSICDXPASTMEDICALHX_GEN_ALL_CORE_FT
PAST MEDICAL HISTORY:  Cataract     Essential hypertension     GERD (gastroesophageal reflux disease)     Mixed hyperlipidemia     Type 2 diabetes mellitus without complication, without long-term current use of insulin

## 2021-08-02 NOTE — H&P PST ADULT - OTHER CARE PROVIDERS
Sharla Puente ([address], [place] [address], [phone]) Sharla Puente ([address] [place] [address] [address], [place] [address], [phone], Fax: [phone])

## 2021-08-02 NOTE — DISCHARGE NOTE PROVIDER - CARE PROVIDER_API CALL
Sharla Puente (DO)  Internal Medicine  [address] [facility], [address]  [place] [address]  Phone: [phone]  Fax: [phone]  Established Patient  Follow Up Time: 2 weeks   Sharla Puente (DO)  Internal Medicine  [address] [facility], [address]  [place] [address]  Phone: [phone]  Fax: [phone]  Established Patient  Follow Up Time: 2 weeks    Tomy Polanco)  Surgery; Thoracic and Cardiac Surgery  [address] [place] [address]  Phone: [phone]  Fax: [phone]  Established Patient  Scheduled Appointment: 08/03/2021 02:15 PM

## 2021-08-02 NOTE — H&P PST ADULT - PRIMARY CARE PROVIDER
Sharla Jules ([address], [place] [address], Phone: [phone], Fax: [phone], Fax: [phone])

## 2021-08-02 NOTE — DISCHARGE NOTE PROVIDER - HOSPITAL COURSE
64y/o male never smoker with history of DM, HTN, HLD, and GERD who was referred to Dr. Puente for an abnormal EKG (NSR with PVC's). He c/o FUENTES (CCS class 2 anginal equivalent) and epigastric pain. He denies palpitations, orthopnea, PND, edema or syncope/near syncope. He had a nuclear stress test during which he exercised for 6:23 and developed 1.5-2 mm horizontal ST depressions in inferolateral leads with inferior T wave inversions. 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 He had a [facility] 66y/o male never smoker with history of DM, HTN, HLD, and GERD who was referred to Dr. Puente for an abnormal EKG (NSR with PVC's). He c/o FUENTES (CCS class 2 anginal equivalent) and epigastric pain. He denies palpitations, orthopnea, PND, edema or syncope/near syncope. He had a nuclear stress test during which he exercised for 6:23 and developed 1.5-2 mm [address] ST depressions in inferolateral leads with inferior T wave inversions. 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 He had a LHC which showed multi-vessel CAD, and is referred to CT Surgery.

## 2021-08-02 NOTE — H&P PST ADULT - HISTORY OF PRESENT ILLNESS
64y/o male never smoker with history of DM, HTN, HLD, and GERD who was referred to Dr. Puente for an abnormal EKG (NSR with PVC's).     Symptoms:        Angina (Class):        Ischemic Symptoms:     Heart Failure: N/A    Assessment of LVEF:       EF: 55-60%       Assessed by: Echo       Date: 7/6/2021    Prior Cardiac Interventions:       PCI's: N/A       CABG: N/A    Noninvasive Testing:   Stress Test: 7/12/2021       Protocol: Albino       Duration of Exercise: 6:23       Symptoms: SOB       EKG Changes: 1.5-2 mm horizontal ST depressions in inferolateral leads with inferior T wave inversions.       DTS: -3.7       Myocardial Imaging: Large, moderate to severe defects in the anterolateral, apical, apical lateral, inferolateral walls which are predominantly reversible c/w small infarction with moderate melissa-infarct ischemia. The apical/distal perfusion defects are predominantly fixed and do not correct on prone imaging. EF: 47%       Risk Assessment: High    Echo: 7/6/2021       LV: Normal LVSF, no SWMA, EF: 55-60%, increased relative wall thickness with normal LV mass index c/w concentric ventricular remodeling. Reduced compliance of LV.       RV: Normal       LA: Normal       RA: Normal       Mitral Valve: MAC and calcified leaflets with normal diastolic opening, mild MR.       Aortic Valve: Calcified trileaflet valve with normal opening and mild AR.       Tricuspid Valve: Minimal TR       Pulmonic Valve: Minimal NC       Pericardium: Normal with no pericardial effusion.    Antianginal Therapies:        Beta Blockers: N/A       Calcium Channel Blockers: N/A       Long Acting Nitrates: N/A       Ranexa: N/A    Associated Risk Factors:        Cerebrovascular Disease: N/A       Chronic Lung Disease: N/A       Peripheral Arterial Disease: N/A       Chronic Kidney Disease (if yes, what is GFR): N/A       Uncontrolled Diabetes (if yes, what is HgbA1C or FBS): N/A       Poorly Controlled Hypertension (if yes, what is SBP): N/A       Morbid Obesity (if yes, what is BMI): N/A       History of Recent Ventricular Arrhythmia: N/A       Inability to Ambulate Safely: N/A       Need for Therapeutic Anticoagulation: N/A       Antiplatelet or Contrast Allergy: N/A    Social History:        Marital:        Tobacco:        ETOH:        Caffeine:     ROS:   General: No fevers/chills, no fatigue  HEENT: No viual disturbances, no hearing loss, no headaches, no epistaxis.  CV: No chest pain, no FUENTES, no palpitations, no edema, no orthopnea, no PND.  Respiratory: No dyspnea, no wheeze, no cough.  GI: no nausea/vomiting, no black/bloody stools.  : No hematuria  Musculoskeletal: No myalgias, no arthralgias, anibal back pain.  Neurologic: No weakness, no hemiparesis, no paresthesias, no seizures, no syncope/near syncope.    Vital Signs Last 24 Hrs  T(C): --  T(F): --  HR: --  BP: --  BP(mean): --  RR: --  SpO2: --    Physical Examination:   General: Awake, alert, speech clear, no acute distress.  HEENT: PERRL, EOMI.  Neck: No elevated JVP, no bruit, trachea midline.  Chest: CTA B/L, S1, S2, no murmur, RRR.  Abdomen: Soft, nontender, nondistended, normal bowel sounds.  Extremities: No edema, 2+ pulses X 4 extremities.  Neurologic: A&OX3, CN 2-12 grossly intact. 64y/o male never smoker with history of DM, HTN, HLD, and GERD who was referred to Dr. Puente for an abnormal EKG (NSR with PVC's). He c/o FUENTES (CCS class 2 anginal equivalent) and epigastric pain. He denies palpitations, orthopnea, PND, edema or syncope/near syncope. He had a nuclear stress test during which he exercised for 6:23 and developed 1.5-2 mm horizontal ST depressions in inferolateral leads with inferior T wave inversions. 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    Symptoms:        Angina (Class): N/A       Ischemic Symptoms: FUENTES (CCS class 2 anginal equivalent)    Heart Failure: N/A    Assessment of LVEF:       EF: 55-60%       Assessed by: Echo       Date: 7/6/2021    Prior Cardiac Interventions:       PCI's: N/A       CABG: N/A    Noninvasive Testing:   Stress Test: 7/12/2021       Protocol: Albino       Duration of Exercise: 6:23       Symptoms: SOB       EKG Changes: 1.5-2 mm horizontal ST depressions in inferolateral leads with inferior T wave inversions.       DTS: -3.7       Myocardial Imaging: Large, moderate to severe defects in the anterolateral, apical, apical lateral, inferolateral walls which are predominantly reversible c/w small infarction with moderate melissa-infarct ischemia. The apical/distal perfusion defects are predominantly fixed and do not correct on prone imaging. EF: 47%       Risk Assessment: High    Echo: 7/6/2021       LV: Normal LVSF, no SWMA, EF: 55-60%, increased relative wall thickness with normal LV mass index c/w concentric ventricular remodeling. Reduced compliance of LV.       RV: Normal       LA: Normal       RA: Normal       Mitral Valve: MAC and calcified leaflets with normal diastolic opening, mild MR.       Aortic Valve: Calcified trileaflet valve with normal opening and mild AR.       Tricuspid Valve: Minimal TR       Pulmonic Valve: Minimal OR       Pericardium: Normal with no pericardial effusion.    Antianginal Therapies:        Beta Blockers: N/A       Calcium Channel Blockers: N/A       Long Acting Nitrates: N/A       Ranexa: N/A    Associated Risk Factors:        Cerebrovascular Disease: N/A       Chronic Lung Disease: N/A       Peripheral Arterial Disease: N/A       Chronic Kidney Disease (if yes, what is GFR): N/A       Uncontrolled Diabetes (if yes, what is HgbA1C or FBS): N/A       Poorly Controlled Hypertension (if yes, what is SBP): N/A       Morbid Obesity (if yes, what is BMI): N/A       History of Recent Ventricular Arrhythmia: N/A       Inability to Ambulate Safely: N/A       Need for Therapeutic Anticoagulation: N/A       Antiplatelet or Contrast Allergy: N/A    Social History:        Marital: , lives with SO       Tobacco: Never smoker       ETOH: 1-2 beers 2-3 time per week.       Caffeine: 2 cups coffee/day    ROS:   General: No fevers/chills, no fatigue  HEENT: No visual disturbances, no hearing loss, no headaches, no epistaxis.  CV: No chest pain, + FUENTES, no palpitations, no edema, no orthopnea, no PND.  Respiratory: No dyspnea, no wheeze, no cough.  GI: no nausea/vomiting, no black/bloody stools. + epigastric pain.  : No hematuria  Musculoskeletal: No myalgias, right elbow pain, anibal back pain.  Neurologic: No weakness, no hemiparesis, + paresthesias, no seizures, no syncope/near syncope.    T(C): 36.7 (08-02-21 @ 08:08), Max: 36.7 (08-02-21 @ 08:08)  HR: 77 (08-02-21 @ 08:08)  BP: 183/91 (08-02-21 @ 08:08)  RR: 18 (08-02-21 @ 08:08)  SpO2: 99% (08-02-21 @ 08:08)    Physical Examination:   General: Awake, alert, speech clear, no acute distress.  HEENT: PERRL, EOMI.  Neck: No elevated JVP, no bruit, trachea midline.  Chest: CTA B/L, S1, S2, no murmur, RRR.  Abdomen: Soft, nontender, nondistended, normal bowel sounds.  Extremities: No edema, 2+ pulses X 4 extremities.  Neurologic: A&OX3, CN 2-12 grossly intact.

## 2021-08-02 NOTE — DISCHARGE NOTE NURSING/CASE MANAGEMENT/SOCIAL WORK - PATIENT PORTAL LINK FT
You can access the FollowMyHealth Patient Portal offered by [facility] by registering at the following website: [email or web address]. By joining [facility]’s FollowMyHealth portal, you will also be able to view your health information using other applications (apps) compatible with our system.

## 2021-08-02 NOTE — PROGRESS NOTE ADULT - ASSESSMENT
65y Male   Procedure: Left heart catheterization    1. S/P LHC: Severe multivessel CAD  ·	Remove radial band at: 11:15AM  ·	Wrist precautions explained.  ·	Medications: Continue current medications.  ·	CT Surgery evaluation for CABG.  ·	TTE, B/L carotid dopplers, CXR, bedside PFT prior to dischage    2. HLD  ·	Increase Crestor to 20 mg daily    3. HTN  ·	Continue lisinopril 40 mg daily.   ·	Add Toprol 25 mg daily.    Discharge Planning:   ·	If OK and testing done, discharge home at: 12:15PM  ·	Follow up as an outpatient with: Dr. Polanco     65y Male   Procedure: Left heart catheterization    1. S/P LHC: Severe multivessel CAD  ·	Remove radial band at: 11:15AM  ·	Wrist precautions explained.  ·	Medications: Continue current medications.  ·	CT Surgery evaluation for CABG.  ·	TTE, B/L carotid dopplers, CXR, bedside PFT prior to dischage    2. HLD  ·	Increase Crestor to 20 mg daily    3. HTN  ·	Continue lisinopril 40 mg daily.   ·	Add Toprol 25 mg daily.    4. DM  ·	Continue glimepiride 4 mg daily  ·	Restart metformin 1000 mg BID on 8/5/2021    Discharge Planning:   ·	If OK and testing done, discharge home at: 12:15PM  ·	Follow up as an outpatient with: Dr. Polanco

## 2021-08-02 NOTE — DISCHARGE NOTE PROVIDER - NSDCCPTREATMENT_GEN_ALL_CORE_FT
PRINCIPAL PROCEDURE  Procedure: Left heart catheterization  Findings and Treatment:   LM: No significant CAD  LAD: 90% mid stenosis and a 70% stenosis  LCX: Proximal 80% stenosis and occluded OM3.  RCA: Occluded proximally.

## 2021-08-02 NOTE — DISCHARGE NOTE PROVIDER - NSDCCPCAREPLAN_GEN_ALL_CORE_FT
PRINCIPAL DISCHARGE DIAGNOSIS  Diagnosis: Multiple vessel coronary artery disease  Assessment and Plan of Treatment:   Wrist precautions explained.  Medications: Continue current medications.  CT Surgery evaluation for CABG. (Dr. Polanco)  TTE, B/L carotid dopplers, CXR, bedside PFT prior to dischage      SECONDARY DISCHARGE DIAGNOSES  Diagnosis: Essential hypertension  Assessment and Plan of Treatment:   Continue lisinopril 40 mg daily  Add Toprol 25 mg daily    Diagnosis: Mixed hyperlipidemia  Assessment and Plan of Treatment:   Increase Crestor to 20 mg daily    Diagnosis: Type 2 diabetes mellitus with other circulatory complication, without long-term current use of insul  Assessment and Plan of Treatment:   Continue glimepiride 4 mg daily  Restart metformin 1000 mg BID on 8/5/2021

## 2021-08-02 NOTE — DISCHARGE NOTE PROVIDER - CARE PROVIDERS DIRECT ADDRESSES
[email or web address]

## 2021-08-03 ENCOUNTER — APPOINTMENT (OUTPATIENT)
Dept: CARDIOTHORACIC SURGERY | Facility: CLINIC | Age: 66
End: 2021-08-03
Payer: MEDICARE

## 2021-08-03 VITALS
SYSTOLIC BLOOD PRESSURE: 172 MMHG | BODY MASS INDEX: 31.5 KG/M2 | HEART RATE: 77 BPM | HEIGHT: 70 IN | OXYGEN SATURATION: 97 % | DIASTOLIC BLOOD PRESSURE: 98 MMHG | WEIGHT: 220 LBS | RESPIRATION RATE: 16 BRPM

## 2021-08-03 DIAGNOSIS — R94.39 ABNORMAL RESULT OF OTHER CARDIOVASCULAR FUNCTION STUDY: ICD-10-CM

## 2021-08-03 PROCEDURE — 94010 BREATHING CAPACITY TEST: CPT

## 2021-08-03 PROCEDURE — 99152 MOD SED SAME PHYS/QHP 5/>YRS: CPT

## 2021-08-03 PROCEDURE — C1887: CPT

## 2021-08-03 PROCEDURE — 99205 OFFICE O/P NEW HI 60 MIN: CPT

## 2021-08-03 PROCEDURE — 80053 COMPREHEN METABOLIC PANEL: CPT

## 2021-08-03 PROCEDURE — 85025 COMPLETE CBC W/AUTO DIFF WBC: CPT

## 2021-08-03 PROCEDURE — 99153 MOD SED SAME PHYS/QHP EA: CPT

## 2021-08-03 PROCEDURE — 93880 EXTRACRANIAL BILAT STUDY: CPT

## 2021-08-03 PROCEDURE — C1769: CPT

## 2021-08-03 PROCEDURE — 93005 ELECTROCARDIOGRAM TRACING: CPT

## 2021-08-03 PROCEDURE — 93454 CORONARY ARTERY ANGIO S&I: CPT

## 2021-08-03 PROCEDURE — 71045 X-RAY EXAM CHEST 1 VIEW: CPT

## 2021-08-03 PROCEDURE — 93306 TTE W/DOPPLER COMPLETE: CPT

## 2021-08-03 PROCEDURE — 36415 COLL VENOUS BLD VENIPUNCTURE: CPT

## 2021-08-03 PROCEDURE — 83735 ASSAY OF MAGNESIUM: CPT

## 2021-08-04 PROBLEM — E11.9 TYPE 2 DIABETES MELLITUS WITHOUT COMPLICATIONS: Chronic | Status: ACTIVE | Noted: 2021-08-02

## 2021-08-04 PROBLEM — K21.9 GASTRO-ESOPHAGEAL REFLUX DISEASE WITHOUT ESOPHAGITIS: Chronic | Status: ACTIVE | Noted: 2021-08-02

## 2021-08-04 PROBLEM — I10 ESSENTIAL (PRIMARY) HYPERTENSION: Chronic | Status: ACTIVE | Noted: 2021-08-02

## 2021-08-04 PROBLEM — H26.9 UNSPECIFIED CATARACT: Chronic | Status: ACTIVE | Noted: 2021-08-02

## 2021-08-04 PROBLEM — E78.2 MIXED HYPERLIPIDEMIA: Chronic | Status: ACTIVE | Noted: 2021-08-02

## 2021-08-05 RX ORDER — METFORMIN HYDROCHLORIDE 850 MG/1
2 TABLET ORAL
Qty: 0 | Refills: 0 | DISCHARGE
Start: 2021-08-05

## 2021-08-06 NOTE — ASSESSMENT
[FreeTextEntry1] : Mr Paris reports to the office today after cardiac catheterization reveals significant coronary artery disease. He will require bypass surgery. At the time of Catheterization an ultrasound of the carotids was performed revealing a 70% SELVIN occlusion. I am requesting that he be evaluated by Vascular Surgery prior to surgical intervention. He will be vein mapped in the office to assure adequate vein prior to proceeding. \par \par The procedure, hospital stay and recovery was discussed in detail. All risks, benefits, and alternatives discussed at length with patient. All questions addressed. Patient would like to proceed with surgical intervention as discussed.\par \par PLAN:\par - Vascular Surgery Consult for SELVIN occlusion\par - CABG x 3 (LIMA and SVG, RCA, LAD)\par \par Ultrasound vein mapping results: \par Left leg:  thigh small 0.22 to 0.24  LLE  : Lower extremity small 0.20\par Right leg:  Upper thigh 0.26, mid thigh. 0..30, just above knee 0.30.,  RLL small, difficult to image. 0.25 just below knee.\par - Would recommend vein harvest from  right  lower extremity from below knee to groin \par Ronald Guevara PA-C\par \par \par I, Mook Heaton NP am scribing for and in the presence of Dr. Polanco the following sections HISTORY OF PRESENT ILLNESS, PAST MEDICAL/FAMILY/SOCIAL HISTORY; REVIEW OF SYSTEMS; VITAL SIGNS; PHYSICAL EXAM; DISPOSITION.\par \par "I personally performed the services described in the documentation, reviewed the documentation recorded by the scribe in my presence and accurately and completely records my words and actions."\par

## 2021-08-06 NOTE — PHYSICAL EXAM
[General Appearance - Well Nourished] : well nourished [General Appearance - Well Developed] : well developed [Sclera] : the sclera and conjunctiva were normal [Outer Ear] : the ears and nose were normal in appearance [Neck Appearance] : the appearance of the neck was normal [Jugular Venous Distention Increased] : there was no jugular-venous distention [Respiration, Rhythm And Depth] : normal respiratory rhythm and effort [Auscultation Breath Sounds / Voice Sounds] : lungs were clear to auscultation bilaterally [Heart Rate And Rhythm] : heart rate was normal and rhythm regular [Examination Of The Chest] : the chest was normal in appearance [Chest Visual Inspection Thoracic Asymmetry] : no chest asymmetry [2+] : left 2+ [Abnormal Walk] : normal gait [Motor Tone] : muscle strength and tone were normal [Skin Color & Pigmentation] : normal skin color and pigmentation [Skin Lesions] : no skin lesions [Sensation] : the sensory exam was normal to light touch and pinprick [Motor Exam] : the motor exam was normal [Oriented To Time, Place, And Person] : oriented to person, place, and time [Impaired Insight] : insight and judgment were intact [FreeTextEntry1] : NYHAC I    No murmur appreciated

## 2021-08-06 NOTE — HISTORY OF PRESENT ILLNESS
[FreeTextEntry1] : Mr. ALONSO is a 65 year old male referred by Dr. Puente who presents for consultation. His past medical history includes HTN, HLD, Diabetes, GERD, and hiatal hernia.\par \par He reports to the office today to discuss Cardiac Cath, Carotid artery duplex. \par \par He has had long term GERD and was evaluated by GI who did an endoscopy and \par \par \par

## 2021-08-06 NOTE — CONSULT LETTER
[Dear  ___] : Dear  [unfilled], [Consult Letter:] : I had the pleasure of evaluating your patient, [unfilled]. [Please see my note below.] : Please see my note below. [Consult Closing:] : Thank you very much for allowing me to participate in the care of this patient.  If you have any questions, please do not hesitate to contact me. [Sincerely,] : Sincerely, [FreeTextEntry2] : Sharla Puente MD [FreeTextEntry3] : Tomy Polanco MD\par  of Cardiothoracic Surgery\par [facility]\par [address] \[place] [address]\par [phone]\par

## 2021-08-06 NOTE — REASON FOR VISIT
[Initial Evaluation] : an initial evaluation [Spouse] : spouse [FreeTextEntry1] : coronary artery disease

## 2021-08-06 NOTE — DATA REVIEWED
[FreeTextEntry1] : Carotid Artery Stenosis 8/2/21 at [facility] \par - IMPRESSION: There is a severe, greater than 70% stenosis of the right internal carotid artery.\par No hemodynamically significant carotid artery stenosis is present on the left.\par \par \par Transthoracic Echocardiogram from 8/2/21 at [facility] \par Summary: \par  1. Technically difficult study with poor endocardial visualization despite use of IV echo contrast. \par  2. Endocardial visualization was enhanced with intravenous echo contrast. \par  3. Normal global left ventricular systolic function. \par  4. Left ventricular ejection fraction, by visual estimation, is 55 to 60%. \par  5. Mildly increased LV wall thickness. \par  6. Normal left ventricular internal cavity size. \par  7. The basal and mid inferior wall appear hypokinetic. \par  8. Normal right ventricular size and function. \par  9. Moderately enlarged left atrium. \par 10. The right atrium is normal in size. \par 11. Mild mitral annular calcification. \par 12. Mild thickening and calcification of the anterior and posterior mitral valve leaflets. \par 13. There is a small, round echodensity attached to the anterior mitral valve leaflet which may be consistent with calcification, recommend clinical correlation. \par 14. Trace mitral valve regurgitation.\par \par \par \par

## 2021-08-06 NOTE — REVIEW OF SYSTEMS
[Heartburn] : heartburn [Negative] : Heme/Lymph [Feeling Poorly] : not feeling poorly [Feeling Tired] : not feeling tired [Chest Pain] : no chest pain [Palpitations] : no palpitations [Lower Ext Edema] : no extremity edema [Shortness Of Breath] : no shortness of breath [Cough] : no cough [SOB on Exertion] : no shortness of breath during exertion [Orthopnea] : no orthopnea

## 2021-08-13 ENCOUNTER — APPOINTMENT (OUTPATIENT)
Dept: VASCULAR SURGERY | Facility: CLINIC | Age: 66
End: 2021-08-13
Payer: MEDICARE

## 2021-08-13 VITALS
SYSTOLIC BLOOD PRESSURE: 123 MMHG | HEART RATE: 60 BPM | OXYGEN SATURATION: 96 % | BODY MASS INDEX: 31.5 KG/M2 | TEMPERATURE: 97.6 F | WEIGHT: 220 LBS | DIASTOLIC BLOOD PRESSURE: 79 MMHG | HEIGHT: 70 IN

## 2021-08-13 DIAGNOSIS — Z01.812 ENCOUNTER FOR PREPROCEDURAL LABORATORY EXAMINATION: ICD-10-CM

## 2021-08-13 DIAGNOSIS — I65.29 OCCLUSION AND STENOSIS OF UNSPECIFIED CAROTID ARTERY: ICD-10-CM

## 2021-08-13 PROCEDURE — 99204 OFFICE O/P NEW MOD 45 MIN: CPT

## 2021-08-13 PROCEDURE — 93880 EXTRACRANIAL BILAT STUDY: CPT

## 2021-08-13 NOTE — HISTORY OF PRESENT ILLNESS
[FreeTextEntry1] : 64 yo M with history of HTN, DM, CAD with triple vessel disease found to have severe, asymptomatic right ICA stenosis >70% while admitted to [facility]. Pt denies history of TIA/stroke. He is scheduled for a CABG x3 on 8/30/21.\par No complaints at this time. On ASA and Statin daily

## 2021-08-13 NOTE — ASSESSMENT
[FreeTextEntry1] : 64 yo M with history of HTN, DM, CAD with triple vessel disease found to have severe, asymptomatic right ICA stenosis >70% while admitted to [facility].\par \par Carotid duplex demonstrates >70% stenosis of R ICA and <50% stenosis of L ICA. Vertebral arteries antegrade flow. [Medication Management] : medication management [Carotid Endarterectomy] : carotid endarterectomy

## 2021-08-13 NOTE — PHYSICAL EXAM
[Normal Rate and Rhythm] : normal rate and rhythm [2+] : left 2+ [Ankle Swelling (On Exam)] : not present [Varicose Veins Of Lower Extremities] : not present [] : not present [Abdomen Tenderness] : ~T ~M No abdominal tenderness [No Rash or Lesion] : No rash or lesion [Alert] : alert [Oriented to Person] : oriented to person [Oriented to Place] : oriented to place [Oriented to Time] : oriented to time [Calm] : calm [de-identified] : NAD [de-identified] : supple, no masses [de-identified] : unlabored breathing [de-identified] : FROM of all 4 extremities\par

## 2021-08-15 ENCOUNTER — LABORATORY RESULT (OUTPATIENT)
Age: 66
End: 2021-08-15

## 2021-08-15 ENCOUNTER — APPOINTMENT (OUTPATIENT)
Dept: DISASTER EMERGENCY | Facility: CLINIC | Age: 66
End: 2021-08-15

## 2021-08-18 ENCOUNTER — APPOINTMENT (OUTPATIENT)
Dept: PULMONOLOGY | Facility: CLINIC | Age: 66
End: 2021-08-18
Payer: MEDICARE

## 2021-08-18 ENCOUNTER — RESULT REVIEW (OUTPATIENT)
Age: 66
End: 2021-08-18

## 2021-08-18 ENCOUNTER — OUTPATIENT (OUTPATIENT)
Dept: OUTPATIENT SERVICES | Facility: HOSPITAL | Age: 66
LOS: 1 days | End: 2021-08-18
Payer: MEDICARE

## 2021-08-18 VITALS
HEART RATE: 65 BPM | DIASTOLIC BLOOD PRESSURE: 71 MMHG | OXYGEN SATURATION: 98 % | WEIGHT: 205.03 LBS | TEMPERATURE: 97 F | RESPIRATION RATE: 20 BRPM | SYSTOLIC BLOOD PRESSURE: 133 MMHG | HEIGHT: 69 IN

## 2021-08-18 VITALS — HEIGHT: 69.5 IN | WEIGHT: 206.31 LBS | BODY MASS INDEX: 29.87 KG/M2

## 2021-08-18 DIAGNOSIS — I25.10 ATHEROSCLEROTIC HEART DISEASE OF NATIVE CORONARY ARTERY WITHOUT ANGINA PECTORIS: ICD-10-CM

## 2021-08-18 DIAGNOSIS — E11.9 TYPE 2 DIABETES MELLITUS WITHOUT COMPLICATIONS: ICD-10-CM

## 2021-08-18 DIAGNOSIS — Z29.9 ENCOUNTER FOR PROPHYLACTIC MEASURES, UNSPECIFIED: ICD-10-CM

## 2021-08-18 DIAGNOSIS — U07.1 COVID-19: ICD-10-CM

## 2021-08-18 DIAGNOSIS — Z98.890 OTHER SPECIFIED POSTPROCEDURAL STATES: Chronic | ICD-10-CM

## 2021-08-18 DIAGNOSIS — Z98.49 CATARACT EXTRACTION STATUS, UNSPECIFIED EYE: Chronic | ICD-10-CM

## 2021-08-18 DIAGNOSIS — Z01.818 ENCOUNTER FOR OTHER PREPROCEDURAL EXAMINATION: ICD-10-CM

## 2021-08-18 LAB
A1C WITH ESTIMATED AVERAGE GLUCOSE RESULT: 6.3 % — HIGH (ref 4–5.6)
ALBUMIN SERPL ELPH-MCNC: 4.9 G/DL — SIGNIFICANT CHANGE UP (ref 3.3–5.2)
ALP SERPL-CCNC: 36 U/L — LOW (ref 40–120)
ALT FLD-CCNC: 23 U/L — SIGNIFICANT CHANGE UP
ANION GAP SERPL CALC-SCNC: 12 MMOL/L — SIGNIFICANT CHANGE UP (ref 5–17)
APPEARANCE UR: CLEAR — SIGNIFICANT CHANGE UP
APTT BLD: 27.6 SEC — SIGNIFICANT CHANGE UP (ref 27.5–35.5)
AST SERPL-CCNC: 24 U/L — SIGNIFICANT CHANGE UP
BASOPHILS # BLD AUTO: 0.08 K/UL — SIGNIFICANT CHANGE UP (ref 0–0.2)
BASOPHILS NFR BLD AUTO: 0.8 % — SIGNIFICANT CHANGE UP (ref 0–2)
BILIRUB SERPL-MCNC: 0.6 MG/DL — SIGNIFICANT CHANGE UP (ref 0.4–2)
BILIRUB UR-MCNC: NEGATIVE — SIGNIFICANT CHANGE UP
BLD GP AB SCN SERPL QL: SIGNIFICANT CHANGE UP
BUN SERPL-MCNC: 12.8 MG/DL — SIGNIFICANT CHANGE UP (ref 8–20)
CALCIUM SERPL-MCNC: 10.4 MG/DL — HIGH (ref 8.6–10.2)
CHLORIDE SERPL-SCNC: 103 MMOL/L — SIGNIFICANT CHANGE UP (ref 98–107)
CO2 SERPL-SCNC: 27 MMOL/L — SIGNIFICANT CHANGE UP (ref 22–29)
COLOR SPEC: YELLOW — SIGNIFICANT CHANGE UP
CREAT SERPL-MCNC: 0.98 MG/DL — SIGNIFICANT CHANGE UP (ref 0.5–1.3)
DIFF PNL FLD: NEGATIVE — SIGNIFICANT CHANGE UP
EOSINOPHIL # BLD AUTO: 0.17 K/UL — SIGNIFICANT CHANGE UP (ref 0–0.5)
EOSINOPHIL NFR BLD AUTO: 1.6 % — SIGNIFICANT CHANGE UP (ref 0–6)
ESTIMATED AVERAGE GLUCOSE: 134 MG/DL — HIGH (ref 68–114)
GLUCOSE SERPL-MCNC: 139 MG/DL — HIGH (ref 70–99)
GLUCOSE UR QL: NEGATIVE MG/DL — SIGNIFICANT CHANGE UP
HCT VFR BLD CALC: 38.3 % — LOW (ref 39–50)
HGB BLD-MCNC: 12.4 G/DL — LOW (ref 13–17)
IMM GRANULOCYTES NFR BLD AUTO: 0.3 % — SIGNIFICANT CHANGE UP (ref 0–1.5)
INR BLD: 0.94 RATIO — SIGNIFICANT CHANGE UP (ref 0.88–1.16)
KETONES UR-MCNC: NEGATIVE — SIGNIFICANT CHANGE UP
LEUKOCYTE ESTERASE UR-ACNC: NEGATIVE — SIGNIFICANT CHANGE UP
LYMPHOCYTES # BLD AUTO: 1.39 K/UL — SIGNIFICANT CHANGE UP (ref 1–3.3)
LYMPHOCYTES # BLD AUTO: 13.5 % — SIGNIFICANT CHANGE UP (ref 13–44)
MCHC RBC-ENTMCNC: 30.1 PG — SIGNIFICANT CHANGE UP (ref 27–34)
MCHC RBC-ENTMCNC: 32.4 GM/DL — SIGNIFICANT CHANGE UP (ref 32–36)
MCV RBC AUTO: 93 FL — SIGNIFICANT CHANGE UP (ref 80–100)
MONOCYTES # BLD AUTO: 0.48 K/UL — SIGNIFICANT CHANGE UP (ref 0–0.9)
MONOCYTES NFR BLD AUTO: 4.7 % — SIGNIFICANT CHANGE UP (ref 2–14)
MRSA PCR RESULT.: SIGNIFICANT CHANGE UP
NEUTROPHILS # BLD AUTO: 8.16 K/UL — HIGH (ref 1.8–7.4)
NEUTROPHILS NFR BLD AUTO: 79.1 % — HIGH (ref 43–77)
NITRITE UR-MCNC: NEGATIVE — SIGNIFICANT CHANGE UP
NT-PROBNP SERPL-SCNC: 137 PG/ML — SIGNIFICANT CHANGE UP (ref 0–300)
PH UR: 5 — SIGNIFICANT CHANGE UP (ref 5–8)
PLATELET # BLD AUTO: 231 K/UL — SIGNIFICANT CHANGE UP (ref 150–400)
POTASSIUM SERPL-MCNC: 4.5 MMOL/L — SIGNIFICANT CHANGE UP (ref 3.5–5.3)
POTASSIUM SERPL-SCNC: 4.5 MMOL/L — SIGNIFICANT CHANGE UP (ref 3.5–5.3)
PREALB SERPL-MCNC: 31 MG/DL — SIGNIFICANT CHANGE UP (ref 18–38)
PROT SERPL-MCNC: 7.7 G/DL — SIGNIFICANT CHANGE UP (ref 6.6–8.7)
PROT UR-MCNC: NEGATIVE MG/DL — SIGNIFICANT CHANGE UP
PROTHROM AB SERPL-ACNC: 10.9 SEC — SIGNIFICANT CHANGE UP (ref 10.6–13.6)
RBC # BLD: 4.12 M/UL — LOW (ref 4.2–5.8)
RBC # FLD: 12.6 % — SIGNIFICANT CHANGE UP (ref 10.3–14.5)
S AUREUS DNA NOSE QL NAA+PROBE: SIGNIFICANT CHANGE UP
SODIUM SERPL-SCNC: 142 MMOL/L — SIGNIFICANT CHANGE UP (ref 135–145)
SP GR SPEC: 1.02 — SIGNIFICANT CHANGE UP (ref 1.01–1.02)
T3 SERPL-MCNC: 105 NG/DL — SIGNIFICANT CHANGE UP (ref 80–200)
T4 AB SER-ACNC: 6.6 UG/DL — SIGNIFICANT CHANGE UP (ref 4.5–12)
TSH SERPL-MCNC: 1.29 UIU/ML — SIGNIFICANT CHANGE UP (ref 0.27–4.2)
UROBILINOGEN FLD QL: NEGATIVE MG/DL — SIGNIFICANT CHANGE UP
WBC # BLD: 10.31 K/UL — SIGNIFICANT CHANGE UP (ref 3.8–10.5)
WBC # FLD AUTO: 10.31 K/UL — SIGNIFICANT CHANGE UP (ref 3.8–10.5)

## 2021-08-18 PROCEDURE — 85018 HEMOGLOBIN: CPT | Mod: QW

## 2021-08-18 PROCEDURE — 94729 DIFFUSING CAPACITY: CPT

## 2021-08-18 PROCEDURE — G0463: CPT

## 2021-08-18 PROCEDURE — 93005 ELECTROCARDIOGRAM TRACING: CPT

## 2021-08-18 PROCEDURE — 71046 X-RAY EXAM CHEST 2 VIEWS: CPT

## 2021-08-18 PROCEDURE — 93010 ELECTROCARDIOGRAM REPORT: CPT

## 2021-08-18 PROCEDURE — 94010 BREATHING CAPACITY TEST: CPT

## 2021-08-18 PROCEDURE — 94727 GAS DIL/WSHOT DETER LNG VOL: CPT

## 2021-08-18 PROCEDURE — 71046 X-RAY EXAM CHEST 2 VIEWS: CPT | Mod: 26

## 2021-08-18 NOTE — H&P PST ADULT - NSICDXPASTMEDICALHX_GEN_ALL_CORE_FT
PAST MEDICAL HISTORY:  CAD (coronary artery disease)     Cataract     Essential hypertension     GERD (gastroesophageal reflux disease)     History of cardiac cath 2021    Mixed hyperlipidemia     Type 2 diabetes mellitus without complication, without long-term current use of insulin

## 2021-08-18 NOTE — H&P PST ADULT - EKG AND INTERPRETATION
NSR 64  minimal voltage criteria for lvh   inferior infarct age undetermined   final interpretation pending

## 2021-08-18 NOTE — H&P PST ADULT - NSICDXPASTSURGICALHX_GEN_ALL_CORE_FT
PAST SURGICAL HISTORY:  History of cardiac cath     S/P cataract extraction and insertion of intraocular lens

## 2021-08-18 NOTE — H&P PST ADULT - HISTORY OF PRESENT ILLNESS
65 yr old male presents with c/o "blockage in My heart". Pt has had c/o heartburn symptoms usually at night , starting in 2019 , endoscopy was done hiatal hernia noted and pt was put on an acid blocker with little relief. Pt mention heartburn  discomfort to pCP this year at his annual exam and was sent to cardiology for  work up , pt has family history of heart disease.  Pt had abnormal stress test and cardiac cath was done 8/2/21 blockage was noted and pt is now scheduled for CABG x3 on 8/18/21 with DR. Pimentel. 65 yr old male presents with c/o "blockage in My heart". Pt has had c/o heartburn symptoms usually at night , starting in 2019 , endoscopy was done hiatal hernia noted and pt was put on an acid blocker with little relief. Pt mention heartburn  discomfort to pCP this year at his annual exam and was sent to cardiology for  work up , pt has family history of heart disease.  Pt had abnormal stress test and cardiac cath was done 8/2/21 blockage was noted and pt is now scheduled for CABG x3 on 8/18/21 with DR. Pimentel.  Denies cp  or palp.

## 2021-08-18 NOTE — H&P PST ADULT - NSANTHOSAYNRD_GEN_A_CORE
No. ELISABET screening performed.  STOP BANG Legend: 0-2 = LOW Risk; 3-4 = INTERMEDIATE Risk; 5-8 = HIGH Risk

## 2021-08-19 LAB
CULTURE RESULTS: SIGNIFICANT CHANGE UP
SPECIMEN SOURCE: SIGNIFICANT CHANGE UP

## 2021-08-25 ENCOUNTER — RX RENEWAL (OUTPATIENT)
Age: 66
End: 2021-08-25

## 2021-08-25 DIAGNOSIS — Z01.818 ENCOUNTER FOR OTHER PREPROCEDURAL EXAMINATION: ICD-10-CM

## 2021-08-27 ENCOUNTER — APPOINTMENT (OUTPATIENT)
Dept: DISASTER EMERGENCY | Facility: CLINIC | Age: 66
End: 2021-08-27

## 2021-08-28 LAB — SARS-COV-2 N GENE NPH QL NAA+PROBE: NOT DETECTED

## 2021-08-30 ENCOUNTER — INPATIENT (INPATIENT)
Facility: HOSPITAL | Age: 66
LOS: 4 days | Discharge: HOME | DRG: 236 | End: 2021-09-04
Attending: THORACIC SURGERY (CARDIOTHORACIC VASCULAR SURGERY) | Admitting: THORACIC SURGERY (CARDIOTHORACIC VASCULAR SURGERY)
Payer: MEDICARE

## 2021-08-30 ENCOUNTER — APPOINTMENT (OUTPATIENT)
Dept: CARDIOTHORACIC SURGERY | Facility: HOSPITAL | Age: 66
End: 2021-08-30

## 2021-08-30 VITALS
HEIGHT: 70 IN | TEMPERATURE: 97 F | OXYGEN SATURATION: 96 % | WEIGHT: 205.91 LBS | RESPIRATION RATE: 16 BRPM | DIASTOLIC BLOOD PRESSURE: 70 MMHG | HEART RATE: 65 BPM | SYSTOLIC BLOOD PRESSURE: 114 MMHG

## 2021-08-30 DIAGNOSIS — I25.10 ATHEROSCLEROTIC HEART DISEASE OF NATIVE CORONARY ARTERY WITHOUT ANGINA PECTORIS: ICD-10-CM

## 2021-08-30 DIAGNOSIS — Z98.890 OTHER SPECIFIED POSTPROCEDURAL STATES: Chronic | ICD-10-CM

## 2021-08-30 DIAGNOSIS — Z98.49 CATARACT EXTRACTION STATUS, UNSPECIFIED EYE: Chronic | ICD-10-CM

## 2021-08-30 DIAGNOSIS — Z01.811 ENCOUNTER FOR PREPROCEDURAL RESPIRATORY EXAMINATION: ICD-10-CM

## 2021-08-30 LAB
ALBUMIN SERPL ELPH-MCNC: 3.5 G/DL — SIGNIFICANT CHANGE UP (ref 3.3–5.2)
ALP SERPL-CCNC: 27 U/L — LOW (ref 40–120)
ALT FLD-CCNC: 19 U/L — SIGNIFICANT CHANGE UP
ANION GAP SERPL CALC-SCNC: 12 MMOL/L — SIGNIFICANT CHANGE UP (ref 5–17)
APTT BLD: 22.9 SEC — LOW (ref 27.5–35.5)
AST SERPL-CCNC: 55 U/L — HIGH
BASOPHILS # BLD AUTO: 0.04 K/UL — SIGNIFICANT CHANGE UP (ref 0–0.2)
BASOPHILS NFR BLD AUTO: 0.3 % — SIGNIFICANT CHANGE UP (ref 0–2)
BILIRUB SERPL-MCNC: 0.8 MG/DL — SIGNIFICANT CHANGE UP (ref 0.4–2)
BUN SERPL-MCNC: 15.5 MG/DL — SIGNIFICANT CHANGE UP (ref 8–20)
CALCIUM SERPL-MCNC: 8.6 MG/DL — SIGNIFICANT CHANGE UP (ref 8.6–10.2)
CHLORIDE SERPL-SCNC: 107 MMOL/L — SIGNIFICANT CHANGE UP (ref 98–107)
CK MB CFR SERPL CALC: 56.3 NG/ML — HIGH (ref 0–6.7)
CK SERPL-CCNC: 755 U/L — HIGH (ref 30–200)
CO2 SERPL-SCNC: 22 MMOL/L — SIGNIFICANT CHANGE UP (ref 22–29)
CREAT SERPL-MCNC: 0.73 MG/DL — SIGNIFICANT CHANGE UP (ref 0.5–1.3)
EOSINOPHIL # BLD AUTO: 0.07 K/UL — SIGNIFICANT CHANGE UP (ref 0–0.5)
EOSINOPHIL NFR BLD AUTO: 0.6 % — SIGNIFICANT CHANGE UP (ref 0–6)
GAS PNL BLDA: SIGNIFICANT CHANGE UP
GAS PNL BLDA: SIGNIFICANT CHANGE UP
GLUCOSE BLDC GLUCOMTR-MCNC: 109 MG/DL — HIGH (ref 70–99)
GLUCOSE BLDC GLUCOMTR-MCNC: 112 MG/DL — HIGH (ref 70–99)
GLUCOSE BLDC GLUCOMTR-MCNC: 118 MG/DL — HIGH (ref 70–99)
GLUCOSE BLDC GLUCOMTR-MCNC: 119 MG/DL — HIGH (ref 70–99)
GLUCOSE BLDC GLUCOMTR-MCNC: 127 MG/DL — HIGH (ref 70–99)
GLUCOSE BLDC GLUCOMTR-MCNC: 128 MG/DL — HIGH (ref 70–99)
GLUCOSE BLDC GLUCOMTR-MCNC: 141 MG/DL — HIGH (ref 70–99)
GLUCOSE BLDC GLUCOMTR-MCNC: 142 MG/DL — HIGH (ref 70–99)
GLUCOSE BLDC GLUCOMTR-MCNC: 148 MG/DL — HIGH (ref 70–99)
GLUCOSE BLDC GLUCOMTR-MCNC: 160 MG/DL — HIGH (ref 70–99)
GLUCOSE BLDC GLUCOMTR-MCNC: 165 MG/DL — HIGH (ref 70–99)
GLUCOSE BLDC GLUCOMTR-MCNC: 175 MG/DL — HIGH (ref 70–99)
GLUCOSE SERPL-MCNC: 151 MG/DL — HIGH (ref 70–99)
HCT VFR BLD CALC: 27.2 % — LOW (ref 39–50)
HGB BLD-MCNC: 9.6 G/DL — LOW (ref 13–17)
IMM GRANULOCYTES NFR BLD AUTO: 0.7 % — SIGNIFICANT CHANGE UP (ref 0–1.5)
INR BLD: 1.15 RATIO — SIGNIFICANT CHANGE UP (ref 0.88–1.16)
LYMPHOCYTES # BLD AUTO: 0.9 K/UL — LOW (ref 1–3.3)
LYMPHOCYTES # BLD AUTO: 7.2 % — LOW (ref 13–44)
MAGNESIUM SERPL-MCNC: 2.6 MG/DL — SIGNIFICANT CHANGE UP (ref 1.8–2.6)
MCHC RBC-ENTMCNC: 31.3 PG — SIGNIFICANT CHANGE UP (ref 27–34)
MCHC RBC-ENTMCNC: 35.3 GM/DL — SIGNIFICANT CHANGE UP (ref 32–36)
MCV RBC AUTO: 88.6 FL — SIGNIFICANT CHANGE UP (ref 80–100)
MONOCYTES # BLD AUTO: 0.23 K/UL — SIGNIFICANT CHANGE UP (ref 0–0.9)
MONOCYTES NFR BLD AUTO: 1.8 % — LOW (ref 2–14)
NEUTROPHILS # BLD AUTO: 11.15 K/UL — HIGH (ref 1.8–7.4)
NEUTROPHILS NFR BLD AUTO: 89.4 % — HIGH (ref 43–77)
PLATELET # BLD AUTO: 165 K/UL — SIGNIFICANT CHANGE UP (ref 150–400)
POTASSIUM SERPL-MCNC: 3.8 MMOL/L — SIGNIFICANT CHANGE UP (ref 3.5–5.3)
POTASSIUM SERPL-SCNC: 3.8 MMOL/L — SIGNIFICANT CHANGE UP (ref 3.5–5.3)
PROT SERPL-MCNC: 5.2 G/DL — LOW (ref 6.6–8.7)
PROTHROM AB SERPL-ACNC: 13.2 SEC — SIGNIFICANT CHANGE UP (ref 10.6–13.6)
RBC # BLD: 3.07 M/UL — LOW (ref 4.2–5.8)
RBC # FLD: 12.6 % — SIGNIFICANT CHANGE UP (ref 10.3–14.5)
SODIUM SERPL-SCNC: 141 MMOL/L — SIGNIFICANT CHANGE UP (ref 135–145)
TROPONIN T SERPL-MCNC: 1.06 NG/ML — HIGH (ref 0–0.06)
WBC # BLD: 12.48 K/UL — HIGH (ref 3.8–10.5)
WBC # FLD AUTO: 12.48 K/UL — HIGH (ref 3.8–10.5)

## 2021-08-30 PROCEDURE — 33519 CABG ARTERY-VEIN THREE: CPT

## 2021-08-30 PROCEDURE — 71045 X-RAY EXAM CHEST 1 VIEW: CPT | Mod: 26

## 2021-08-30 PROCEDURE — 33533 CABG ARTERIAL SINGLE: CPT

## 2021-08-30 PROCEDURE — 33508 ENDOSCOPIC VEIN HARVEST: CPT | Mod: 59

## 2021-08-30 PROCEDURE — 76998 US GUIDE INTRAOP: CPT | Mod: 26,59

## 2021-08-30 PROCEDURE — 33533 CABG ARTERIAL SINGLE: CPT | Mod: AS

## 2021-08-30 PROCEDURE — 33519 CABG ARTERY-VEIN THREE: CPT | Mod: AS

## 2021-08-30 PROCEDURE — 76998 US GUIDE INTRAOP: CPT | Mod: 26,AS,59

## 2021-08-30 RX ORDER — ONDANSETRON 8 MG/1
4 TABLET, FILM COATED ORAL EVERY 4 HOURS
Refills: 0 | Status: DISCONTINUED | OUTPATIENT
Start: 2021-08-30 | End: 2021-08-30

## 2021-08-30 RX ORDER — ONDANSETRON 8 MG/1
4 TABLET, FILM COATED ORAL EVERY 4 HOURS
Refills: 0 | Status: DISCONTINUED | OUTPATIENT
Start: 2021-08-30 | End: 2021-09-04

## 2021-08-30 RX ORDER — ASPIRIN/CALCIUM CARB/MAGNESIUM 324 MG
325 TABLET ORAL ONCE
Refills: 0 | Status: DISCONTINUED | OUTPATIENT
Start: 2021-08-30 | End: 2021-08-30

## 2021-08-30 RX ORDER — ATORVASTATIN CALCIUM 80 MG/1
40 TABLET, FILM COATED ORAL AT BEDTIME
Refills: 0 | Status: DISCONTINUED | OUTPATIENT
Start: 2021-08-30 | End: 2021-08-30

## 2021-08-30 RX ORDER — VANCOMYCIN HCL 1 G
1500 VIAL (EA) INTRAVENOUS EVERY 12 HOURS
Refills: 0 | Status: COMPLETED | OUTPATIENT
Start: 2021-08-30 | End: 2021-09-01

## 2021-08-30 RX ORDER — SODIUM CHLORIDE 9 MG/ML
3 INJECTION INTRAMUSCULAR; INTRAVENOUS; SUBCUTANEOUS EVERY 8 HOURS
Refills: 0 | Status: DISCONTINUED | OUTPATIENT
Start: 2021-08-30 | End: 2021-08-30

## 2021-08-30 RX ORDER — HYDROMORPHONE HYDROCHLORIDE 2 MG/ML
0.5 INJECTION INTRAMUSCULAR; INTRAVENOUS; SUBCUTANEOUS ONCE
Refills: 0 | Status: DISCONTINUED | OUTPATIENT
Start: 2021-08-30 | End: 2021-08-30

## 2021-08-30 RX ORDER — ENOXAPARIN SODIUM 100 MG/ML
40 INJECTION SUBCUTANEOUS DAILY
Refills: 0 | Status: DISCONTINUED | OUTPATIENT
Start: 2021-08-31 | End: 2021-09-04

## 2021-08-30 RX ORDER — SODIUM CHLORIDE 9 MG/ML
1000 INJECTION INTRAMUSCULAR; INTRAVENOUS; SUBCUTANEOUS
Refills: 0 | Status: DISCONTINUED | OUTPATIENT
Start: 2021-08-30 | End: 2021-09-01

## 2021-08-30 RX ORDER — SODIUM CHLORIDE 9 MG/ML
1000 INJECTION, SOLUTION INTRAVENOUS
Refills: 0 | Status: DISCONTINUED | OUTPATIENT
Start: 2021-08-30 | End: 2021-08-30

## 2021-08-30 RX ORDER — ASPIRIN/CALCIUM CARB/MAGNESIUM 324 MG
81 TABLET ORAL DAILY
Refills: 0 | Status: DISCONTINUED | OUTPATIENT
Start: 2021-08-30 | End: 2021-08-30

## 2021-08-30 RX ORDER — ALBUMIN HUMAN 25 %
250 VIAL (ML) INTRAVENOUS ONCE
Refills: 0 | Status: DISCONTINUED | OUTPATIENT
Start: 2021-08-30 | End: 2021-09-01

## 2021-08-30 RX ORDER — PROPOFOL 10 MG/ML
5 INJECTION, EMULSION INTRAVENOUS
Qty: 1000 | Refills: 0 | Status: DISCONTINUED | OUTPATIENT
Start: 2021-08-30 | End: 2021-08-30

## 2021-08-30 RX ORDER — INSULIN HUMAN 100 [IU]/ML
3 INJECTION, SOLUTION SUBCUTANEOUS
Qty: 50 | Refills: 0 | Status: DISCONTINUED | OUTPATIENT
Start: 2021-08-30 | End: 2021-08-31

## 2021-08-30 RX ORDER — ALBUMIN HUMAN 25 %
250 VIAL (ML) INTRAVENOUS ONCE
Refills: 0 | Status: COMPLETED | OUTPATIENT
Start: 2021-08-30 | End: 2021-08-30

## 2021-08-30 RX ORDER — METOPROLOL TARTRATE 50 MG
2.5 TABLET ORAL ONCE
Refills: 0 | Status: COMPLETED | OUTPATIENT
Start: 2021-08-30 | End: 2021-08-30

## 2021-08-30 RX ORDER — CHLORHEXIDINE GLUCONATE 213 G/1000ML
1 SOLUTION TOPICAL DAILY
Refills: 0 | Status: DISCONTINUED | OUTPATIENT
Start: 2021-08-30 | End: 2021-09-01

## 2021-08-30 RX ORDER — OXYCODONE HYDROCHLORIDE 5 MG/1
10 TABLET ORAL EVERY 4 HOURS
Refills: 0 | Status: DISCONTINUED | OUTPATIENT
Start: 2021-08-30 | End: 2021-09-04

## 2021-08-30 RX ORDER — POTASSIUM CHLORIDE 20 MEQ
10 PACKET (EA) ORAL
Refills: 0 | Status: DISCONTINUED | OUTPATIENT
Start: 2021-08-30 | End: 2021-08-31

## 2021-08-30 RX ORDER — PROTAMINE SULFATE 10 MG/ML
50 AMPUL (ML) INTRAVENOUS ONCE
Refills: 0 | Status: COMPLETED | OUTPATIENT
Start: 2021-08-30 | End: 2021-08-30

## 2021-08-30 RX ORDER — NOREPINEPHRINE BITARTRATE/D5W 8 MG/250ML
0.05 PLASTIC BAG, INJECTION (ML) INTRAVENOUS
Qty: 8 | Refills: 0 | Status: DISCONTINUED | OUTPATIENT
Start: 2021-08-30 | End: 2021-08-31

## 2021-08-30 RX ORDER — CEFUROXIME AXETIL 250 MG
1500 TABLET ORAL EVERY 8 HOURS
Refills: 0 | Status: DISCONTINUED | OUTPATIENT
Start: 2021-08-30 | End: 2021-08-30

## 2021-08-30 RX ORDER — POTASSIUM CHLORIDE 20 MEQ
10 PACKET (EA) ORAL
Refills: 0 | Status: COMPLETED | OUTPATIENT
Start: 2021-08-30 | End: 2021-08-30

## 2021-08-30 RX ORDER — PANTOPRAZOLE SODIUM 20 MG/1
40 TABLET, DELAYED RELEASE ORAL
Refills: 0 | Status: DISCONTINUED | OUTPATIENT
Start: 2021-08-31 | End: 2021-09-04

## 2021-08-30 RX ORDER — PANTOPRAZOLE SODIUM 20 MG/1
40 TABLET, DELAYED RELEASE ORAL DAILY
Refills: 0 | Status: DISCONTINUED | OUTPATIENT
Start: 2021-08-30 | End: 2021-08-30

## 2021-08-30 RX ORDER — VANCOMYCIN HCL 1 G
1000 VIAL (EA) INTRAVENOUS EVERY 12 HOURS
Refills: 0 | Status: DISCONTINUED | OUTPATIENT
Start: 2021-08-30 | End: 2021-08-30

## 2021-08-30 RX ORDER — SENNA PLUS 8.6 MG/1
2 TABLET ORAL AT BEDTIME
Refills: 0 | Status: DISCONTINUED | OUTPATIENT
Start: 2021-08-30 | End: 2021-09-02

## 2021-08-30 RX ORDER — CEFUROXIME AXETIL 250 MG
1500 TABLET ORAL ONCE
Refills: 0 | Status: DISCONTINUED | OUTPATIENT
Start: 2021-08-30 | End: 2021-08-30

## 2021-08-30 RX ORDER — FENTANYL CITRATE 50 UG/ML
50 INJECTION INTRAVENOUS ONCE
Refills: 0 | Status: DISCONTINUED | OUTPATIENT
Start: 2021-08-30 | End: 2021-08-30

## 2021-08-30 RX ORDER — SODIUM CHLORIDE 9 MG/ML
1000 INJECTION, SOLUTION INTRAVENOUS
Refills: 0 | Status: DISCONTINUED | OUTPATIENT
Start: 2021-08-30 | End: 2021-09-01

## 2021-08-30 RX ORDER — PANTOPRAZOLE SODIUM 20 MG/1
40 TABLET, DELAYED RELEASE ORAL ONCE
Refills: 0 | Status: COMPLETED | OUTPATIENT
Start: 2021-08-30 | End: 2021-08-30

## 2021-08-30 RX ORDER — DEXTROSE 50 % IN WATER 50 %
50 SYRINGE (ML) INTRAVENOUS
Refills: 0 | Status: DISCONTINUED | OUTPATIENT
Start: 2021-08-30 | End: 2021-08-31

## 2021-08-30 RX ORDER — CHLORHEXIDINE GLUCONATE 213 G/1000ML
5 SOLUTION TOPICAL
Refills: 0 | Status: DISCONTINUED | OUTPATIENT
Start: 2021-08-30 | End: 2021-08-30

## 2021-08-30 RX ORDER — ASPIRIN/CALCIUM CARB/MAGNESIUM 324 MG
325 TABLET ORAL DAILY
Refills: 0 | Status: DISCONTINUED | OUTPATIENT
Start: 2021-08-31 | End: 2021-09-04

## 2021-08-30 RX ORDER — ACETAMINOPHEN 500 MG
1000 TABLET ORAL ONCE
Refills: 0 | Status: COMPLETED | OUTPATIENT
Start: 2021-08-30 | End: 2021-08-30

## 2021-08-30 RX ORDER — ACETAMINOPHEN 500 MG
650 TABLET ORAL EVERY 4 HOURS
Refills: 0 | Status: DISCONTINUED | OUTPATIENT
Start: 2021-08-30 | End: 2021-09-04

## 2021-08-30 RX ORDER — OXYCODONE HYDROCHLORIDE 5 MG/1
5 TABLET ORAL EVERY 4 HOURS
Refills: 0 | Status: DISCONTINUED | OUTPATIENT
Start: 2021-08-30 | End: 2021-09-04

## 2021-08-30 RX ORDER — CEFUROXIME AXETIL 250 MG
1500 TABLET ORAL EVERY 8 HOURS
Refills: 0 | Status: COMPLETED | OUTPATIENT
Start: 2021-08-30 | End: 2021-09-01

## 2021-08-30 RX ORDER — METOPROLOL TARTRATE 50 MG
2.5 TABLET ORAL EVERY 6 HOURS
Refills: 0 | Status: DISCONTINUED | OUTPATIENT
Start: 2021-08-30 | End: 2021-08-30

## 2021-08-30 RX ORDER — ATORVASTATIN CALCIUM 80 MG/1
80 TABLET, FILM COATED ORAL AT BEDTIME
Refills: 0 | Status: DISCONTINUED | OUTPATIENT
Start: 2021-08-30 | End: 2021-09-04

## 2021-08-30 RX ORDER — CHLORHEXIDINE GLUCONATE 213 G/1000ML
15 SOLUTION TOPICAL EVERY 12 HOURS
Refills: 0 | Status: DISCONTINUED | OUTPATIENT
Start: 2021-08-30 | End: 2021-08-30

## 2021-08-30 RX ORDER — METOPROLOL TARTRATE 50 MG
25 TABLET ORAL
Refills: 0 | Status: DISCONTINUED | OUTPATIENT
Start: 2021-08-31 | End: 2021-09-04

## 2021-08-30 RX ORDER — DEXTROSE 50 % IN WATER 50 %
25 SYRINGE (ML) INTRAVENOUS
Refills: 0 | Status: DISCONTINUED | OUTPATIENT
Start: 2021-08-30 | End: 2021-08-31

## 2021-08-30 RX ORDER — POLYETHYLENE GLYCOL 3350 17 G/17G
17 POWDER, FOR SOLUTION ORAL DAILY
Refills: 0 | Status: DISCONTINUED | OUTPATIENT
Start: 2021-08-30 | End: 2021-09-02

## 2021-08-30 RX ADMIN — Medication 125 MILLILITER(S): at 16:06

## 2021-08-30 RX ADMIN — Medication 100 MILLIGRAM(S): at 16:40

## 2021-08-30 RX ADMIN — ATORVASTATIN CALCIUM 80 MILLIGRAM(S): 80 TABLET, FILM COATED ORAL at 21:22

## 2021-08-30 RX ADMIN — SODIUM CHLORIDE 500 MILLILITER(S): 9 INJECTION, SOLUTION INTRAVENOUS at 16:50

## 2021-08-30 RX ADMIN — Medication 100 MILLIEQUIVALENT(S): at 15:24

## 2021-08-30 RX ADMIN — INSULIN HUMAN 3 UNIT(S)/HR: 100 INJECTION, SOLUTION SUBCUTANEOUS at 12:41

## 2021-08-30 RX ADMIN — Medication 400 MILLIGRAM(S): at 17:46

## 2021-08-30 RX ADMIN — Medication 100 MILLIEQUIVALENT(S): at 16:07

## 2021-08-30 RX ADMIN — SODIUM CHLORIDE 10 MILLILITER(S): 9 INJECTION INTRAMUSCULAR; INTRAVENOUS; SUBCUTANEOUS at 12:41

## 2021-08-30 RX ADMIN — Medication 50 MILLIGRAM(S): at 18:08

## 2021-08-30 RX ADMIN — Medication 1000 MILLIGRAM(S): at 18:16

## 2021-08-30 RX ADMIN — PROPOFOL 2.8 MICROGRAM(S)/KG/MIN: 10 INJECTION, EMULSION INTRAVENOUS at 13:00

## 2021-08-30 RX ADMIN — SODIUM CHLORIDE 3 MILLILITER(S): 9 INJECTION INTRAMUSCULAR; INTRAVENOUS; SUBCUTANEOUS at 13:57

## 2021-08-30 RX ADMIN — Medication 2.5 MILLIGRAM(S): at 22:00

## 2021-08-30 RX ADMIN — Medication 100 MILLIEQUIVALENT(S): at 14:14

## 2021-08-30 RX ADMIN — Medication 125 MILLILITER(S): at 16:08

## 2021-08-30 RX ADMIN — SENNA PLUS 2 TABLET(S): 8.6 TABLET ORAL at 21:22

## 2021-08-30 RX ADMIN — PANTOPRAZOLE SODIUM 40 MILLIGRAM(S): 20 TABLET, DELAYED RELEASE ORAL at 14:14

## 2021-08-30 RX ADMIN — Medication 8.76 MICROGRAM(S)/KG/MIN: at 12:41

## 2021-08-30 RX ADMIN — HYDROMORPHONE HYDROCHLORIDE 0.5 MILLIGRAM(S): 2 INJECTION INTRAMUSCULAR; INTRAVENOUS; SUBCUTANEOUS at 21:45

## 2021-08-30 RX ADMIN — HYDROMORPHONE HYDROCHLORIDE 0.5 MILLIGRAM(S): 2 INJECTION INTRAMUSCULAR; INTRAVENOUS; SUBCUTANEOUS at 21:55

## 2021-08-30 RX ADMIN — Medication 300 MILLIGRAM(S): at 20:20

## 2021-08-30 RX ADMIN — Medication 2.5 MILLIGRAM(S): at 21:00

## 2021-08-30 NOTE — BRIEF OPERATIVE NOTE - COMMENTS
No qualified resident was available to assist in this case. I have personally first assisted the Cardiothoracic Surgeon listed in this brief op note throughout the entirety of this case.     unquantifiable blood loss due to utilization of cell saver     pt to CTICU on levophed and insulin

## 2021-08-30 NOTE — BRIEF OPERATIVE NOTE - NSICDXBRIEFPREOP_GEN_ALL_CORE_FT
PRE-OP DIAGNOSIS:  CAD (coronary artery disease) 30-Aug-2021 12:58:41 LIMA to LAD, SVG to PDA, SVG to OM1 "y"ed to OM3 Aris Lobato

## 2021-08-31 LAB
ALBUMIN SERPL ELPH-MCNC: 3.3 G/DL — SIGNIFICANT CHANGE UP (ref 3.3–5.2)
ALP SERPL-CCNC: 21 U/L — LOW (ref 40–120)
ALT FLD-CCNC: 15 U/L — SIGNIFICANT CHANGE UP
ANION GAP SERPL CALC-SCNC: 10 MMOL/L — SIGNIFICANT CHANGE UP (ref 5–17)
ANION GAP SERPL CALC-SCNC: 11 MMOL/L — SIGNIFICANT CHANGE UP (ref 5–17)
AST SERPL-CCNC: 47 U/L — HIGH
BILIRUB SERPL-MCNC: 0.6 MG/DL — SIGNIFICANT CHANGE UP (ref 0.4–2)
BUN SERPL-MCNC: 12.5 MG/DL — SIGNIFICANT CHANGE UP (ref 8–20)
BUN SERPL-MCNC: 12.6 MG/DL — SIGNIFICANT CHANGE UP (ref 8–20)
CALCIUM SERPL-MCNC: 7.4 MG/DL — LOW (ref 8.6–10.2)
CALCIUM SERPL-MCNC: 7.9 MG/DL — LOW (ref 8.6–10.2)
CHLORIDE SERPL-SCNC: 107 MMOL/L — SIGNIFICANT CHANGE UP (ref 98–107)
CHLORIDE SERPL-SCNC: 112 MMOL/L — HIGH (ref 98–107)
CK MB CFR SERPL CALC: 23.8 NG/ML — HIGH (ref 0–6.7)
CK SERPL-CCNC: 578 U/L — HIGH (ref 30–200)
CO2 SERPL-SCNC: 19 MMOL/L — LOW (ref 22–29)
CO2 SERPL-SCNC: 22 MMOL/L — SIGNIFICANT CHANGE UP (ref 22–29)
COVID-19 SPIKE DOMAIN AB INTERP: POSITIVE
COVID-19 SPIKE DOMAIN ANTIBODY RESULT: 83.3 U/ML — HIGH
CREAT SERPL-MCNC: 0.65 MG/DL — SIGNIFICANT CHANGE UP (ref 0.5–1.3)
CREAT SERPL-MCNC: 0.77 MG/DL — SIGNIFICANT CHANGE UP (ref 0.5–1.3)
GLUCOSE BLDC GLUCOMTR-MCNC: 115 MG/DL — HIGH (ref 70–99)
GLUCOSE BLDC GLUCOMTR-MCNC: 120 MG/DL — HIGH (ref 70–99)
GLUCOSE BLDC GLUCOMTR-MCNC: 121 MG/DL — HIGH (ref 70–99)
GLUCOSE BLDC GLUCOMTR-MCNC: 123 MG/DL — HIGH (ref 70–99)
GLUCOSE BLDC GLUCOMTR-MCNC: 149 MG/DL — HIGH (ref 70–99)
GLUCOSE BLDC GLUCOMTR-MCNC: 150 MG/DL — HIGH (ref 70–99)
GLUCOSE BLDC GLUCOMTR-MCNC: 159 MG/DL — HIGH (ref 70–99)
GLUCOSE BLDC GLUCOMTR-MCNC: 170 MG/DL — HIGH (ref 70–99)
GLUCOSE BLDC GLUCOMTR-MCNC: 173 MG/DL — HIGH (ref 70–99)
GLUCOSE BLDC GLUCOMTR-MCNC: 274 MG/DL — HIGH (ref 70–99)
GLUCOSE SERPL-MCNC: 109 MG/DL — HIGH (ref 70–99)
GLUCOSE SERPL-MCNC: 116 MG/DL — HIGH (ref 70–99)
HCT VFR BLD CALC: 16.8 % — CRITICAL LOW (ref 39–50)
HCT VFR BLD CALC: 23.7 % — LOW (ref 39–50)
HCT VFR BLD CALC: 27.8 % — LOW (ref 39–50)
HGB BLD-MCNC: 5.6 G/DL — CRITICAL LOW (ref 13–17)
HGB BLD-MCNC: 7.9 G/DL — LOW (ref 13–17)
HGB BLD-MCNC: 9.7 G/DL — LOW (ref 13–17)
MAGNESIUM SERPL-MCNC: 1.9 MG/DL — SIGNIFICANT CHANGE UP (ref 1.8–2.6)
MAGNESIUM SERPL-MCNC: 2 MG/DL — SIGNIFICANT CHANGE UP (ref 1.6–2.6)
MCHC RBC-ENTMCNC: 29.4 PG — SIGNIFICANT CHANGE UP (ref 27–34)
MCHC RBC-ENTMCNC: 29.8 PG — SIGNIFICANT CHANGE UP (ref 27–34)
MCHC RBC-ENTMCNC: 30.9 PG — SIGNIFICANT CHANGE UP (ref 27–34)
MCHC RBC-ENTMCNC: 33.3 GM/DL — SIGNIFICANT CHANGE UP (ref 32–36)
MCHC RBC-ENTMCNC: 33.3 GM/DL — SIGNIFICANT CHANGE UP (ref 32–36)
MCHC RBC-ENTMCNC: 34.9 GM/DL — SIGNIFICANT CHANGE UP (ref 32–36)
MCV RBC AUTO: 88.1 FL — SIGNIFICANT CHANGE UP (ref 80–100)
MCV RBC AUTO: 88.5 FL — SIGNIFICANT CHANGE UP (ref 80–100)
MCV RBC AUTO: 89.4 FL — SIGNIFICANT CHANGE UP (ref 80–100)
PLATELET # BLD AUTO: 134 K/UL — LOW (ref 150–400)
PLATELET # BLD AUTO: 181 K/UL — SIGNIFICANT CHANGE UP (ref 150–400)
PLATELET # BLD AUTO: 196 K/UL — SIGNIFICANT CHANGE UP (ref 150–400)
POTASSIUM SERPL-MCNC: 4.1 MMOL/L — SIGNIFICANT CHANGE UP (ref 3.5–5.3)
POTASSIUM SERPL-MCNC: 4.3 MMOL/L — SIGNIFICANT CHANGE UP (ref 3.5–5.3)
POTASSIUM SERPL-SCNC: 4.1 MMOL/L — SIGNIFICANT CHANGE UP (ref 3.5–5.3)
POTASSIUM SERPL-SCNC: 4.3 MMOL/L — SIGNIFICANT CHANGE UP (ref 3.5–5.3)
PROT SERPL-MCNC: 4.9 G/DL — LOW (ref 6.6–8.7)
RBC # BLD: 1.88 M/UL — LOW (ref 4.2–5.8)
RBC # BLD: 2.69 M/UL — LOW (ref 4.2–5.8)
RBC # BLD: 3.14 M/UL — LOW (ref 4.2–5.8)
RBC # FLD: 13.9 % — SIGNIFICANT CHANGE UP (ref 10.3–14.5)
RBC # FLD: 14 % — SIGNIFICANT CHANGE UP (ref 10.3–14.5)
RBC # FLD: 14 % — SIGNIFICANT CHANGE UP (ref 10.3–14.5)
SARS-COV-2 IGG+IGM SERPL QL IA: 83.3 U/ML — HIGH
SARS-COV-2 IGG+IGM SERPL QL IA: POSITIVE
SODIUM SERPL-SCNC: 139 MMOL/L — SIGNIFICANT CHANGE UP (ref 135–145)
SODIUM SERPL-SCNC: 142 MMOL/L — SIGNIFICANT CHANGE UP (ref 135–145)
TROPONIN T SERPL-MCNC: 0.63 NG/ML — HIGH (ref 0–0.06)
WBC # BLD: 12.19 K/UL — HIGH (ref 3.8–10.5)
WBC # BLD: 14.68 K/UL — HIGH (ref 3.8–10.5)
WBC # BLD: 8.52 K/UL — SIGNIFICANT CHANGE UP (ref 3.8–10.5)
WBC # FLD AUTO: 12.19 K/UL — HIGH (ref 3.8–10.5)
WBC # FLD AUTO: 14.68 K/UL — HIGH (ref 3.8–10.5)
WBC # FLD AUTO: 8.52 K/UL — SIGNIFICANT CHANGE UP (ref 3.8–10.5)

## 2021-08-31 PROCEDURE — 93010 ELECTROCARDIOGRAM REPORT: CPT

## 2021-08-31 PROCEDURE — 71045 X-RAY EXAM CHEST 1 VIEW: CPT | Mod: 26

## 2021-08-31 PROCEDURE — 71045 X-RAY EXAM CHEST 1 VIEW: CPT | Mod: 26,77

## 2021-08-31 PROCEDURE — 99024 POSTOP FOLLOW-UP VISIT: CPT

## 2021-08-31 RX ORDER — FUROSEMIDE 40 MG
40 TABLET ORAL ONCE
Refills: 0 | Status: COMPLETED | OUTPATIENT
Start: 2021-08-31 | End: 2021-08-31

## 2021-08-31 RX ORDER — SODIUM CHLORIDE 9 MG/ML
1000 INJECTION, SOLUTION INTRAVENOUS
Refills: 0 | Status: DISCONTINUED | OUTPATIENT
Start: 2021-08-31 | End: 2021-09-01

## 2021-08-31 RX ORDER — FUROSEMIDE 40 MG
40 TABLET ORAL DAILY
Refills: 0 | Status: DISCONTINUED | OUTPATIENT
Start: 2021-09-01 | End: 2021-09-01

## 2021-08-31 RX ORDER — DEXTROSE 50 % IN WATER 50 %
12.5 SYRINGE (ML) INTRAVENOUS ONCE
Refills: 0 | Status: DISCONTINUED | OUTPATIENT
Start: 2021-08-31 | End: 2021-09-01

## 2021-08-31 RX ORDER — DEXTROSE 50 % IN WATER 50 %
25 SYRINGE (ML) INTRAVENOUS ONCE
Refills: 0 | Status: DISCONTINUED | OUTPATIENT
Start: 2021-08-31 | End: 2021-09-01

## 2021-08-31 RX ORDER — HYDROMORPHONE HYDROCHLORIDE 2 MG/ML
0.25 INJECTION INTRAMUSCULAR; INTRAVENOUS; SUBCUTANEOUS ONCE
Refills: 0 | Status: DISCONTINUED | OUTPATIENT
Start: 2021-08-31 | End: 2021-08-31

## 2021-08-31 RX ORDER — FERROUS SULFATE 325(65) MG
325 TABLET ORAL DAILY
Refills: 0 | Status: DISCONTINUED | OUTPATIENT
Start: 2021-08-31 | End: 2021-09-04

## 2021-08-31 RX ORDER — FOLIC ACID 0.8 MG
1 TABLET ORAL DAILY
Refills: 0 | Status: DISCONTINUED | OUTPATIENT
Start: 2021-08-31 | End: 2021-09-04

## 2021-08-31 RX ORDER — GLUCAGON INJECTION, SOLUTION 0.5 MG/.1ML
1 INJECTION, SOLUTION SUBCUTANEOUS ONCE
Refills: 0 | Status: DISCONTINUED | OUTPATIENT
Start: 2021-08-31 | End: 2021-09-04

## 2021-08-31 RX ORDER — ASCORBIC ACID 60 MG
500 TABLET,CHEWABLE ORAL DAILY
Refills: 0 | Status: DISCONTINUED | OUTPATIENT
Start: 2021-08-31 | End: 2021-09-04

## 2021-08-31 RX ORDER — INSULIN GLARGINE 100 [IU]/ML
16 INJECTION, SOLUTION SUBCUTANEOUS AT BEDTIME
Refills: 0 | Status: DISCONTINUED | OUTPATIENT
Start: 2021-08-31 | End: 2021-09-04

## 2021-08-31 RX ORDER — DEXTROSE 50 % IN WATER 50 %
15 SYRINGE (ML) INTRAVENOUS ONCE
Refills: 0 | Status: DISCONTINUED | OUTPATIENT
Start: 2021-08-31 | End: 2021-09-01

## 2021-08-31 RX ORDER — INSULIN LISPRO 100/ML
VIAL (ML) SUBCUTANEOUS
Refills: 0 | Status: DISCONTINUED | OUTPATIENT
Start: 2021-08-31 | End: 2021-09-04

## 2021-08-31 RX ORDER — INSULIN LISPRO 100/ML
4 VIAL (ML) SUBCUTANEOUS
Refills: 0 | Status: DISCONTINUED | OUTPATIENT
Start: 2021-08-31 | End: 2021-09-03

## 2021-08-31 RX ADMIN — HYDROMORPHONE HYDROCHLORIDE 0.25 MILLIGRAM(S): 2 INJECTION INTRAMUSCULAR; INTRAVENOUS; SUBCUTANEOUS at 06:00

## 2021-08-31 RX ADMIN — Medication 40 MILLIGRAM(S): at 08:53

## 2021-08-31 RX ADMIN — Medication 100 MILLIGRAM(S): at 08:00

## 2021-08-31 RX ADMIN — Medication 2: at 11:36

## 2021-08-31 RX ADMIN — Medication 300 MILLIGRAM(S): at 20:23

## 2021-08-31 RX ADMIN — CHLORHEXIDINE GLUCONATE 1 APPLICATION(S): 213 SOLUTION TOPICAL at 11:36

## 2021-08-31 RX ADMIN — Medication 650 MILLIGRAM(S): at 06:00

## 2021-08-31 RX ADMIN — Medication 4 UNIT(S): at 16:25

## 2021-08-31 RX ADMIN — SENNA PLUS 2 TABLET(S): 8.6 TABLET ORAL at 22:14

## 2021-08-31 RX ADMIN — Medication 325 MILLIGRAM(S): at 11:35

## 2021-08-31 RX ADMIN — Medication 2: at 22:13

## 2021-08-31 RX ADMIN — Medication 6: at 16:25

## 2021-08-31 RX ADMIN — OXYCODONE HYDROCHLORIDE 10 MILLIGRAM(S): 5 TABLET ORAL at 10:36

## 2021-08-31 RX ADMIN — ENOXAPARIN SODIUM 40 MILLIGRAM(S): 100 INJECTION SUBCUTANEOUS at 11:35

## 2021-08-31 RX ADMIN — Medication 650 MILLIGRAM(S): at 22:14

## 2021-08-31 RX ADMIN — OXYCODONE HYDROCHLORIDE 10 MILLIGRAM(S): 5 TABLET ORAL at 18:10

## 2021-08-31 RX ADMIN — ATORVASTATIN CALCIUM 80 MILLIGRAM(S): 80 TABLET, FILM COATED ORAL at 22:14

## 2021-08-31 RX ADMIN — OXYCODONE HYDROCHLORIDE 10 MILLIGRAM(S): 5 TABLET ORAL at 11:30

## 2021-08-31 RX ADMIN — Medication 650 MILLIGRAM(S): at 22:44

## 2021-08-31 RX ADMIN — Medication 100 MILLIGRAM(S): at 16:24

## 2021-08-31 RX ADMIN — INSULIN GLARGINE 16 UNIT(S): 100 INJECTION, SOLUTION SUBCUTANEOUS at 22:13

## 2021-08-31 RX ADMIN — Medication 300 MILLIGRAM(S): at 08:00

## 2021-08-31 RX ADMIN — HYDROMORPHONE HYDROCHLORIDE 0.25 MILLIGRAM(S): 2 INJECTION INTRAMUSCULAR; INTRAVENOUS; SUBCUTANEOUS at 18:13

## 2021-08-31 RX ADMIN — HYDROMORPHONE HYDROCHLORIDE 0.25 MILLIGRAM(S): 2 INJECTION INTRAMUSCULAR; INTRAVENOUS; SUBCUTANEOUS at 06:15

## 2021-08-31 RX ADMIN — Medication 500 MILLIGRAM(S): at 11:35

## 2021-08-31 RX ADMIN — Medication 4 UNIT(S): at 11:35

## 2021-08-31 RX ADMIN — HYDROMORPHONE HYDROCHLORIDE 0.25 MILLIGRAM(S): 2 INJECTION INTRAMUSCULAR; INTRAVENOUS; SUBCUTANEOUS at 18:30

## 2021-08-31 RX ADMIN — Medication 1 MILLIGRAM(S): at 11:35

## 2021-08-31 RX ADMIN — Medication 100 MILLIGRAM(S): at 00:00

## 2021-08-31 RX ADMIN — Medication 650 MILLIGRAM(S): at 06:15

## 2021-08-31 RX ADMIN — PANTOPRAZOLE SODIUM 40 MILLIGRAM(S): 20 TABLET, DELAYED RELEASE ORAL at 06:27

## 2021-08-31 RX ADMIN — OXYCODONE HYDROCHLORIDE 10 MILLIGRAM(S): 5 TABLET ORAL at 18:30

## 2021-08-31 RX ADMIN — Medication 25 MILLIGRAM(S): at 17:33

## 2021-08-31 RX ADMIN — POLYETHYLENE GLYCOL 3350 17 GRAM(S): 17 POWDER, FOR SOLUTION ORAL at 11:35

## 2021-08-31 NOTE — PHYSICAL THERAPY INITIAL EVALUATION ADULT - GENERAL OBSERVATIONS, REHAB EVAL
Pt. OOB; +monitor, O2 nasal canula, Right IJ central, radial a-line, chest tubes, trevino, pacer box

## 2021-09-01 LAB
ANION GAP SERPL CALC-SCNC: 8 MMOL/L — SIGNIFICANT CHANGE UP (ref 5–17)
BUN SERPL-MCNC: 19.4 MG/DL — SIGNIFICANT CHANGE UP (ref 8–20)
CALCIUM SERPL-MCNC: 7.9 MG/DL — LOW (ref 8.6–10.2)
CHLORIDE SERPL-SCNC: 104 MMOL/L — SIGNIFICANT CHANGE UP (ref 98–107)
CO2 SERPL-SCNC: 25 MMOL/L — SIGNIFICANT CHANGE UP (ref 22–29)
CREAT SERPL-MCNC: 0.77 MG/DL — SIGNIFICANT CHANGE UP (ref 0.5–1.3)
GLUCOSE BLDC GLUCOMTR-MCNC: 148 MG/DL — HIGH (ref 70–99)
GLUCOSE BLDC GLUCOMTR-MCNC: 151 MG/DL — HIGH (ref 70–99)
GLUCOSE BLDC GLUCOMTR-MCNC: 155 MG/DL — HIGH (ref 70–99)
GLUCOSE BLDC GLUCOMTR-MCNC: 257 MG/DL — HIGH (ref 70–99)
GLUCOSE SERPL-MCNC: 151 MG/DL — HIGH (ref 70–99)
HCT VFR BLD CALC: 24.3 % — LOW (ref 39–50)
HGB BLD-MCNC: 8.1 G/DL — LOW (ref 13–17)
MAGNESIUM SERPL-MCNC: 2.2 MG/DL — SIGNIFICANT CHANGE UP (ref 1.6–2.6)
MCHC RBC-ENTMCNC: 30.1 PG — SIGNIFICANT CHANGE UP (ref 27–34)
MCHC RBC-ENTMCNC: 33.3 GM/DL — SIGNIFICANT CHANGE UP (ref 32–36)
MCV RBC AUTO: 90.3 FL — SIGNIFICANT CHANGE UP (ref 80–100)
PLATELET # BLD AUTO: 154 K/UL — SIGNIFICANT CHANGE UP (ref 150–400)
POTASSIUM SERPL-MCNC: 4.8 MMOL/L — SIGNIFICANT CHANGE UP (ref 3.5–5.3)
POTASSIUM SERPL-SCNC: 4.8 MMOL/L — SIGNIFICANT CHANGE UP (ref 3.5–5.3)
RBC # BLD: 2.69 M/UL — LOW (ref 4.2–5.8)
RBC # FLD: 14.2 % — SIGNIFICANT CHANGE UP (ref 10.3–14.5)
SODIUM SERPL-SCNC: 137 MMOL/L — SIGNIFICANT CHANGE UP (ref 135–145)
WBC # BLD: 12.37 K/UL — HIGH (ref 3.8–10.5)
WBC # FLD AUTO: 12.37 K/UL — HIGH (ref 3.8–10.5)

## 2021-09-01 PROCEDURE — 71045 X-RAY EXAM CHEST 1 VIEW: CPT | Mod: 26

## 2021-09-01 PROCEDURE — 99024 POSTOP FOLLOW-UP VISIT: CPT

## 2021-09-01 RX ORDER — SODIUM CHLORIDE 9 MG/ML
3 INJECTION INTRAMUSCULAR; INTRAVENOUS; SUBCUTANEOUS EVERY 8 HOURS
Refills: 0 | Status: DISCONTINUED | OUTPATIENT
Start: 2021-09-01 | End: 2021-09-04

## 2021-09-01 RX ORDER — FUROSEMIDE 40 MG
40 TABLET ORAL
Refills: 0 | Status: DISCONTINUED | OUTPATIENT
Start: 2021-09-01 | End: 2021-09-04

## 2021-09-01 RX ADMIN — INSULIN GLARGINE 16 UNIT(S): 100 INJECTION, SOLUTION SUBCUTANEOUS at 21:42

## 2021-09-01 RX ADMIN — Medication 650 MILLIGRAM(S): at 11:39

## 2021-09-01 RX ADMIN — Medication 2: at 17:40

## 2021-09-01 RX ADMIN — Medication 325 MILLIGRAM(S): at 11:09

## 2021-09-01 RX ADMIN — SODIUM CHLORIDE 3 MILLILITER(S): 9 INJECTION INTRAMUSCULAR; INTRAVENOUS; SUBCUTANEOUS at 20:28

## 2021-09-01 RX ADMIN — Medication 2: at 07:32

## 2021-09-01 RX ADMIN — SODIUM CHLORIDE 3 MILLILITER(S): 9 INJECTION INTRAMUSCULAR; INTRAVENOUS; SUBCUTANEOUS at 05:59

## 2021-09-01 RX ADMIN — Medication 100 MILLIGRAM(S): at 00:30

## 2021-09-01 RX ADMIN — Medication 650 MILLIGRAM(S): at 06:01

## 2021-09-01 RX ADMIN — Medication 25 MILLIGRAM(S): at 06:02

## 2021-09-01 RX ADMIN — POLYETHYLENE GLYCOL 3350 17 GRAM(S): 17 POWDER, FOR SOLUTION ORAL at 11:09

## 2021-09-01 RX ADMIN — Medication 4 UNIT(S): at 17:40

## 2021-09-01 RX ADMIN — ATORVASTATIN CALCIUM 80 MILLIGRAM(S): 80 TABLET, FILM COATED ORAL at 21:44

## 2021-09-01 RX ADMIN — Medication 25 MILLIGRAM(S): at 17:39

## 2021-09-01 RX ADMIN — Medication 100 MILLIGRAM(S): at 07:32

## 2021-09-01 RX ADMIN — Medication 40 MILLIGRAM(S): at 17:39

## 2021-09-01 RX ADMIN — SODIUM CHLORIDE 3 MILLILITER(S): 9 INJECTION INTRAMUSCULAR; INTRAVENOUS; SUBCUTANEOUS at 13:02

## 2021-09-01 RX ADMIN — Medication 4 UNIT(S): at 07:32

## 2021-09-01 RX ADMIN — Medication 300 MILLIGRAM(S): at 07:32

## 2021-09-01 RX ADMIN — Medication 4 UNIT(S): at 12:55

## 2021-09-01 RX ADMIN — ENOXAPARIN SODIUM 40 MILLIGRAM(S): 100 INJECTION SUBCUTANEOUS at 21:43

## 2021-09-01 RX ADMIN — Medication 1 MILLIGRAM(S): at 11:09

## 2021-09-01 RX ADMIN — Medication 40 MILLIGRAM(S): at 06:01

## 2021-09-01 RX ADMIN — Medication 6: at 12:55

## 2021-09-01 RX ADMIN — Medication 650 MILLIGRAM(S): at 17:44

## 2021-09-01 RX ADMIN — Medication 650 MILLIGRAM(S): at 21:42

## 2021-09-01 RX ADMIN — Medication 650 MILLIGRAM(S): at 11:09

## 2021-09-01 RX ADMIN — PANTOPRAZOLE SODIUM 40 MILLIGRAM(S): 20 TABLET, DELAYED RELEASE ORAL at 06:02

## 2021-09-01 RX ADMIN — Medication 500 MILLIGRAM(S): at 11:09

## 2021-09-01 RX ADMIN — Medication 650 MILLIGRAM(S): at 07:00

## 2021-09-02 DIAGNOSIS — D62 ACUTE POSTHEMORRHAGIC ANEMIA: ICD-10-CM

## 2021-09-02 LAB
ANION GAP SERPL CALC-SCNC: 11 MMOL/L — SIGNIFICANT CHANGE UP (ref 5–17)
BUN SERPL-MCNC: 17.9 MG/DL — SIGNIFICANT CHANGE UP (ref 8–20)
CALCIUM SERPL-MCNC: 8.9 MG/DL — SIGNIFICANT CHANGE UP (ref 8.6–10.2)
CHLORIDE SERPL-SCNC: 103 MMOL/L — SIGNIFICANT CHANGE UP (ref 98–107)
CO2 SERPL-SCNC: 26 MMOL/L — SIGNIFICANT CHANGE UP (ref 22–29)
CREAT SERPL-MCNC: 0.78 MG/DL — SIGNIFICANT CHANGE UP (ref 0.5–1.3)
GLUCOSE BLDC GLUCOMTR-MCNC: 141 MG/DL — HIGH (ref 70–99)
GLUCOSE BLDC GLUCOMTR-MCNC: 147 MG/DL — HIGH (ref 70–99)
GLUCOSE BLDC GLUCOMTR-MCNC: 147 MG/DL — HIGH (ref 70–99)
GLUCOSE BLDC GLUCOMTR-MCNC: 152 MG/DL — HIGH (ref 70–99)
GLUCOSE SERPL-MCNC: 128 MG/DL — HIGH (ref 70–99)
HCT VFR BLD CALC: 28.2 % — LOW (ref 39–50)
HGB BLD-MCNC: 9.4 G/DL — LOW (ref 13–17)
MAGNESIUM SERPL-MCNC: 2.1 MG/DL — SIGNIFICANT CHANGE UP (ref 1.6–2.6)
MCHC RBC-ENTMCNC: 29.9 PG — SIGNIFICANT CHANGE UP (ref 27–34)
MCHC RBC-ENTMCNC: 33.3 GM/DL — SIGNIFICANT CHANGE UP (ref 32–36)
MCV RBC AUTO: 89.8 FL — SIGNIFICANT CHANGE UP (ref 80–100)
PLATELET # BLD AUTO: 198 K/UL — SIGNIFICANT CHANGE UP (ref 150–400)
POTASSIUM SERPL-MCNC: 4.1 MMOL/L — SIGNIFICANT CHANGE UP (ref 3.5–5.3)
POTASSIUM SERPL-SCNC: 4.1 MMOL/L — SIGNIFICANT CHANGE UP (ref 3.5–5.3)
RBC # BLD: 3.14 M/UL — LOW (ref 4.2–5.8)
RBC # FLD: 14.2 % — SIGNIFICANT CHANGE UP (ref 10.3–14.5)
SODIUM SERPL-SCNC: 140 MMOL/L — SIGNIFICANT CHANGE UP (ref 135–145)
WBC # BLD: 11.87 K/UL — HIGH (ref 3.8–10.5)
WBC # FLD AUTO: 11.87 K/UL — HIGH (ref 3.8–10.5)

## 2021-09-02 PROCEDURE — 99222 1ST HOSP IP/OBS MODERATE 55: CPT

## 2021-09-02 PROCEDURE — 71045 X-RAY EXAM CHEST 1 VIEW: CPT | Mod: 26

## 2021-09-02 PROCEDURE — 99024 POSTOP FOLLOW-UP VISIT: CPT

## 2021-09-02 RX ORDER — SENNA PLUS 8.6 MG/1
2 TABLET ORAL AT BEDTIME
Refills: 0 | Status: DISCONTINUED | OUTPATIENT
Start: 2021-09-02 | End: 2021-09-03

## 2021-09-02 RX ORDER — PSYLLIUM SEED (WITH DEXTROSE)
1 POWDER (GRAM) ORAL
Refills: 0 | Status: DISCONTINUED | OUTPATIENT
Start: 2021-09-02 | End: 2021-09-04

## 2021-09-02 RX ADMIN — Medication 1 PACKET(S): at 17:34

## 2021-09-02 RX ADMIN — ATORVASTATIN CALCIUM 80 MILLIGRAM(S): 80 TABLET, FILM COATED ORAL at 21:43

## 2021-09-02 RX ADMIN — Medication 650 MILLIGRAM(S): at 17:38

## 2021-09-02 RX ADMIN — PANTOPRAZOLE SODIUM 40 MILLIGRAM(S): 20 TABLET, DELAYED RELEASE ORAL at 06:00

## 2021-09-02 RX ADMIN — Medication 325 MILLIGRAM(S): at 09:09

## 2021-09-02 RX ADMIN — Medication 25 MILLIGRAM(S): at 06:00

## 2021-09-02 RX ADMIN — SODIUM CHLORIDE 3 MILLILITER(S): 9 INJECTION INTRAMUSCULAR; INTRAVENOUS; SUBCUTANEOUS at 15:26

## 2021-09-02 RX ADMIN — Medication 1 MILLIGRAM(S): at 09:09

## 2021-09-02 RX ADMIN — Medication 500 MILLIGRAM(S): at 09:09

## 2021-09-02 RX ADMIN — Medication 25 MILLIGRAM(S): at 17:34

## 2021-09-02 RX ADMIN — ENOXAPARIN SODIUM 40 MILLIGRAM(S): 100 INJECTION SUBCUTANEOUS at 21:44

## 2021-09-02 RX ADMIN — Medication 650 MILLIGRAM(S): at 21:43

## 2021-09-02 RX ADMIN — Medication 4 UNIT(S): at 12:41

## 2021-09-02 RX ADMIN — Medication 40 MILLIGRAM(S): at 17:34

## 2021-09-02 RX ADMIN — Medication 650 MILLIGRAM(S): at 18:08

## 2021-09-02 RX ADMIN — Medication 1 PACKET(S): at 12:41

## 2021-09-02 RX ADMIN — Medication 650 MILLIGRAM(S): at 06:01

## 2021-09-02 RX ADMIN — Medication 4 UNIT(S): at 08:52

## 2021-09-02 RX ADMIN — Medication 2: at 21:45

## 2021-09-02 RX ADMIN — INSULIN GLARGINE 16 UNIT(S): 100 INJECTION, SOLUTION SUBCUTANEOUS at 21:44

## 2021-09-02 RX ADMIN — Medication 4 UNIT(S): at 17:34

## 2021-09-02 RX ADMIN — SODIUM CHLORIDE 3 MILLILITER(S): 9 INJECTION INTRAMUSCULAR; INTRAVENOUS; SUBCUTANEOUS at 19:08

## 2021-09-02 RX ADMIN — Medication 40 MILLIGRAM(S): at 06:00

## 2021-09-02 RX ADMIN — SODIUM CHLORIDE 3 MILLILITER(S): 9 INJECTION INTRAMUSCULAR; INTRAVENOUS; SUBCUTANEOUS at 03:40

## 2021-09-02 NOTE — CHART NOTE - NSCHARTNOTEFT_GEN_A_CORE
POD 3 s/p C4L with Dr. Polanco. Pt seen and examined, LABS/CXR/VS/Medications reviewed with Dr. polanco.  Pt remains hemodynamically stable. isolate PW today, plan to remove tomorrow and tentative d/c home sat. cont PT, Incentive Spirometry, OOB    d/w Dr. Polanco POD 3 s/p C4L with Dr. Polanco. Pt seen and examined, LABS/CXR/VS/Medications reviewed with Dr. Polanco.  Pt remains hemodynamically stable. isolate PW today, plan to remove tomorrow and tentative d/c home sat. cont PT, Incentive Spirometry, OOB  well controlled DM on oral agents preop, endo consult appreciated, will d/c premeal and start home metformin, then d/c home on Jardiance and metformin if insurance will cover, otherwise can go back on glimepiride.     d/w Dr. Polanco

## 2021-09-02 NOTE — CONSULT NOTE ADULT - ASSESSMENT
65 year old male with type 2 DM, HTN, HLD, carotid stenosis, CAD admitted for 4V CABG.  His diabetes is currently controlled on basal bolus insulin therapy as an inpatient.      1.  Type 2 DM-  since patient will be discharged soon, Ok to start transition back to oral agents.   Ok to start Metformin 500 mg BID and hold prandial insulin.   Continue basal insulin for now.  Once ready for discharge, can D/C basal insulin and increase metformin back to 1000 mg BID.  Instead of continuing glimepiride, which has hypoglycemic risk, would recommend starting Jardiance 10 mg daily on discharge as this has cardiac benefit, less hypoglycemic risk and weight benefit.  Would recommend starting SMBG on discharge as well as following up with us in the office for further diabetes education.    2.  CAD s/p CABG-  continue ASA, statin and metoprolol  3.  HLD-  continue high dose statin therapy.

## 2021-09-02 NOTE — CONSULT NOTE ADULT - SUBJECTIVE AND OBJECTIVE BOX
HPI:  65 year old male with PMH of Type 2 DM, HTN and HLD who was admitted to [facility] on 8/30/2021 for 4V CABG.  Over the past 2 years, patient has bene having episodes of burning in his chest, which he though was heart burn, however symptoms did not improve with GI therapy and his EGD was unremarkable.  He recently went for cardiac evaluation including stress test that was abnormal and cath showed multivessel CAD.  He is now admitted for CABG which was completed on 8/31/2021.  He currently denies CP or SOB.    Patient was diagnosed with Type 2 DM about 5 years ago.  His DM is management by his PCP.  His outpatient regimen consists of Metformin 1000 mg BID and Glimepiride 4 mg daily, which was added about 2 years ago.  His diabetes is currently well controlled after the addition of these medications with A1c of 6.3%.  Patient does not monitor his blood glucose at home.  He denies any associated microvascular complications.      PAST MEDICAL & SURGICAL HISTORY:  Mixed hyperlipidemia  Essential hypertension  Type 2 diabetes mellitus without complication, without long-term current use of insulin  Cataract  GERD (gastroesophageal reflux disease)  CAD (coronary artery disease)  History of cardiac cath  2021  S/P cataract extraction and insertion of intraocular lens  History of cardiac cath    Allergies  No Known Allergies    MEDICATIONS  (STANDING):  ascorbic acid 500 milliGRAM(s) Oral daily  aspirin 325 milliGRAM(s) Oral daily  atorvastatin 80 milliGRAM(s) Oral at bedtime  enoxaparin Injectable 40 milliGRAM(s) SubCutaneous daily  ferrous    sulfate 325 milliGRAM(s) Oral daily  folic acid 1 milliGRAM(s) Oral daily  furosemide    Tablet 40 milliGRAM(s) Oral two times a day  glucagon  Injectable 1 milliGRAM(s) IntraMuscular once  insulin glargine Injectable (LANTUS) 16 Unit(s) SubCutaneous at bedtime  insulin lispro (ADMELOG) corrective regimen sliding scale   SubCutaneous Before meals and at bedtime  insulin lispro Injectable (ADMELOG) 4 Unit(s) SubCutaneous three times a day before meals  metoprolol tartrate 25 milliGRAM(s) Oral two times a day  pantoprazole    Tablet 40 milliGRAM(s) Oral before breakfast  psyllium Powder 1 Packet(s) Oral two times a day  senna 2 Tablet(s) Oral at bedtime  sodium chloride 0.9% lock flush 3 milliLiter(s) IV Push every 8 hours    SH:  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     Vital Signs Last 24 Hrs  T(C): 36.7 (02 Sep 2021 10:19), Max: 37 (01 Sep 2021 21:38)  T(F): 98.1 (02 Sep 2021 10:19), Max: 98.6 (01 Sep 2021 21:38)  HR: 89 (02 Sep 2021 10:19) (84 - 95)  BP: 121/72 (02 Sep 2021 10:19) (103/69 - 121/72)  RR: 18 (02 Sep 2021 10:19) (18 - 18)  SpO2: 96% (02 Sep 2021 10:19) (92% - 97%)    PE:  Gen: AAO, NAD  HEENT:  sclera anicteric, MMM  Neck:  no thyromegaly, no LAD  CV:  nl S1 + S2, RRR, no m/r/g  Resp:  nl respiratory effort, CTA b/l  GI/ Abd: soft, NT/ ND, BS +  Neuro:  No tremor, sensation intact to light touch on b/l feet  MS:  no c/c/e, nl muscle tone  Skin:  no foot ulcers, no acanthosis  Psych:  affect appropriate    LABS:  A1C with Estimated Average Glucose Result: 6.3 % (08.18.21 @ 17:18)    Thyroid Stimulating Hormone, Serum: 1.29 uIU/mL (08.18.21 @ 17:18)    09-02    140  |  103  |  17.9  ----------------------------<  128<H>  4.1   |  26.0  |  0.78    Ca    8.9      02 Sep 2021 08:01  Mg     2.1     09-02                          9.4    11.87 )-----------( 198      ( 02 Sep 2021 08:01 )             28.2     CAPILLARY BLOOD GLUCOSE  POCT Blood Glucose.: 147 mg/dL (02 Sep 2021 08:07)  POCT Blood Glucose.: 148 mg/dL (01 Sep 2021 21:41)  POCT Blood Glucose.: 155 mg/dL (01 Sep 2021 17:10)  POCT Blood Glucose.: 257 mg/dL (01 Sep 2021 12:52)

## 2021-09-03 LAB
ANION GAP SERPL CALC-SCNC: 11 MMOL/L — SIGNIFICANT CHANGE UP (ref 5–17)
BUN SERPL-MCNC: 15.9 MG/DL — SIGNIFICANT CHANGE UP (ref 8–20)
CALCIUM SERPL-MCNC: 8.7 MG/DL — SIGNIFICANT CHANGE UP (ref 8.6–10.2)
CHLORIDE SERPL-SCNC: 102 MMOL/L — SIGNIFICANT CHANGE UP (ref 98–107)
CO2 SERPL-SCNC: 27 MMOL/L — SIGNIFICANT CHANGE UP (ref 22–29)
CREAT SERPL-MCNC: 0.81 MG/DL — SIGNIFICANT CHANGE UP (ref 0.5–1.3)
GLUCOSE BLDC GLUCOMTR-MCNC: 120 MG/DL — HIGH (ref 70–99)
GLUCOSE BLDC GLUCOMTR-MCNC: 128 MG/DL — HIGH (ref 70–99)
GLUCOSE BLDC GLUCOMTR-MCNC: 129 MG/DL — HIGH (ref 70–99)
GLUCOSE BLDC GLUCOMTR-MCNC: 141 MG/DL — HIGH (ref 70–99)
GLUCOSE SERPL-MCNC: 105 MG/DL — HIGH (ref 70–99)
HCT VFR BLD CALC: 26 % — LOW (ref 39–50)
HGB BLD-MCNC: 8.8 G/DL — LOW (ref 13–17)
MAGNESIUM SERPL-MCNC: 2 MG/DL — SIGNIFICANT CHANGE UP (ref 1.6–2.6)
MCHC RBC-ENTMCNC: 30.1 PG — SIGNIFICANT CHANGE UP (ref 27–34)
MCHC RBC-ENTMCNC: 33.8 GM/DL — SIGNIFICANT CHANGE UP (ref 32–36)
MCV RBC AUTO: 89 FL — SIGNIFICANT CHANGE UP (ref 80–100)
PLATELET # BLD AUTO: 219 K/UL — SIGNIFICANT CHANGE UP (ref 150–400)
POTASSIUM SERPL-MCNC: 3.9 MMOL/L — SIGNIFICANT CHANGE UP (ref 3.5–5.3)
POTASSIUM SERPL-SCNC: 3.9 MMOL/L — SIGNIFICANT CHANGE UP (ref 3.5–5.3)
RBC # BLD: 2.92 M/UL — LOW (ref 4.2–5.8)
RBC # FLD: 13.6 % — SIGNIFICANT CHANGE UP (ref 10.3–14.5)
SODIUM SERPL-SCNC: 140 MMOL/L — SIGNIFICANT CHANGE UP (ref 135–145)
WBC # BLD: 9.12 K/UL — SIGNIFICANT CHANGE UP (ref 3.8–10.5)
WBC # FLD AUTO: 9.12 K/UL — SIGNIFICANT CHANGE UP (ref 3.8–10.5)

## 2021-09-03 PROCEDURE — 99232 SBSQ HOSP IP/OBS MODERATE 35: CPT

## 2021-09-03 PROCEDURE — 71045 X-RAY EXAM CHEST 1 VIEW: CPT | Mod: 26

## 2021-09-03 PROCEDURE — 99024 POSTOP FOLLOW-UP VISIT: CPT

## 2021-09-03 RX ORDER — METFORMIN HYDROCHLORIDE 850 MG/1
1000 TABLET ORAL
Refills: 0 | Status: DISCONTINUED | OUTPATIENT
Start: 2021-09-03 | End: 2021-09-04

## 2021-09-03 RX ORDER — POTASSIUM CHLORIDE 20 MEQ
20 PACKET (EA) ORAL DAILY
Refills: 0 | Status: DISCONTINUED | OUTPATIENT
Start: 2021-09-03 | End: 2021-09-04

## 2021-09-03 RX ADMIN — SODIUM CHLORIDE 3 MILLILITER(S): 9 INJECTION INTRAMUSCULAR; INTRAVENOUS; SUBCUTANEOUS at 15:46

## 2021-09-03 RX ADMIN — SODIUM CHLORIDE 3 MILLILITER(S): 9 INJECTION INTRAMUSCULAR; INTRAVENOUS; SUBCUTANEOUS at 22:34

## 2021-09-03 RX ADMIN — ATORVASTATIN CALCIUM 80 MILLIGRAM(S): 80 TABLET, FILM COATED ORAL at 22:30

## 2021-09-03 RX ADMIN — SODIUM CHLORIDE 3 MILLILITER(S): 9 INJECTION INTRAMUSCULAR; INTRAVENOUS; SUBCUTANEOUS at 03:54

## 2021-09-03 RX ADMIN — Medication 500 MILLIGRAM(S): at 11:21

## 2021-09-03 RX ADMIN — Medication 25 MILLIGRAM(S): at 05:33

## 2021-09-03 RX ADMIN — Medication 1 PACKET(S): at 17:17

## 2021-09-03 RX ADMIN — INSULIN GLARGINE 16 UNIT(S): 100 INJECTION, SOLUTION SUBCUTANEOUS at 22:30

## 2021-09-03 RX ADMIN — Medication 650 MILLIGRAM(S): at 11:55

## 2021-09-03 RX ADMIN — Medication 325 MILLIGRAM(S): at 11:21

## 2021-09-03 RX ADMIN — Medication 1 PACKET(S): at 05:33

## 2021-09-03 RX ADMIN — Medication 40 MILLIGRAM(S): at 17:16

## 2021-09-03 RX ADMIN — Medication 1 MILLIGRAM(S): at 11:21

## 2021-09-03 RX ADMIN — Medication 650 MILLIGRAM(S): at 17:17

## 2021-09-03 RX ADMIN — Medication 650 MILLIGRAM(S): at 11:25

## 2021-09-03 RX ADMIN — Medication 20 MILLIEQUIVALENT(S): at 09:51

## 2021-09-03 RX ADMIN — ENOXAPARIN SODIUM 40 MILLIGRAM(S): 100 INJECTION SUBCUTANEOUS at 11:21

## 2021-09-03 RX ADMIN — Medication 40 MILLIGRAM(S): at 05:33

## 2021-09-03 RX ADMIN — Medication 650 MILLIGRAM(S): at 05:34

## 2021-09-03 RX ADMIN — PANTOPRAZOLE SODIUM 40 MILLIGRAM(S): 20 TABLET, DELAYED RELEASE ORAL at 05:33

## 2021-09-03 RX ADMIN — Medication 25 MILLIGRAM(S): at 17:16

## 2021-09-03 RX ADMIN — METFORMIN HYDROCHLORIDE 1000 MILLIGRAM(S): 850 TABLET ORAL at 17:16

## 2021-09-03 RX ADMIN — Medication 4 UNIT(S): at 08:33

## 2021-09-03 NOTE — PROGRESS NOTE ADULT - PROBLEM SELECTOR PLAN 3
HA1c 6.3 on oral meds preop.  Continue fingersticks AC/HS.   Continue lantus, premeal insulin, and ISS PRN.   Diabetic / consistent carb diet.
- Pain control with oxy ir and tylenol prn  - Encourage DBE/IS, wean NC as tolerated   - Daily CXR  - consider lopressor, hold for SBP less than 100 or HR less than 60    - Asa therapy for graft patency  - Statin therapy for chronic graft occlusion ppx   - Protonix for GI ppx  - Strict i/o's  - Chemical DVT ppx with lovenox, maintain SCD's for mechanical DVT ppx
- Pain control with oxy ir and tylenol prn  - Encourage DBE/IS, wean NC as tolerated   - Daily CXR  - continue lopressor, hold for SBP less than 100 or HR less than 60    - Asa therapy for graft patency  - Statin therapy for chronic graft occlusion ppx   - Protonix for GI ppx  - Strict i/o's  - Chemical DVT ppx with lovenox, maintain SCD's for mechanical DVT ppx
HA1c 6.3 on oral meds preop.  Continue fingersticks AC/HS.   Continue lantus, premeal insulin, and ISS PRN.   Diabetic / consistent carb diet.  Plan to discharge home on Metformin and Jardiance.

## 2021-09-03 NOTE — PROGRESS NOTE ADULT - PROBLEM SELECTOR PLAN 5
Lovenox and SCDs for DVT prophylaxis.   Protonix for GI prophylaxis.  Bowel regimen discontinued as patient passed 3 BMs since surgery.    Plan to be discussed / reviewed with CT Surgery attending / team during AM rounds.
Lovenox and SCDs for DVT prophylaxis.   Protonix for GI prophylaxis.  Bowel regimen discontinued as patient passed 3 BMs since surgery.    Dispo: Home tomorrow.   Plan to be discussed / reviewed with CT Surgery attending / team during AM rounds.

## 2021-09-03 NOTE — PROGRESS NOTE ADULT - PROBLEM SELECTOR PLAN 1
continue protonix gi ppx  continue colace for gi px  SCD's and lovenox
S/p C4L 8/30/21.   Neurologically intact.  Hemodynamically stable.  Trending H/H.   Continue lopressor as tolerated by HR and BP.   Continue aspirin for acute graft closure prophylaxis.  Continue statin for chronic graft patency prophylaxis.  Oxygenating well, coughing and deep breathing exercises/incentive spirometry encouraged.  Follow up AM CXR.   Lasix 40mg PO BID for diuresis, replenish electrolytes PRN to keep K>4 and Mg>2.  Strict I/Os to trend urine output.   Continue with PT, increase activity as tolerated  FS AC+HS with coverage for glycemic control.  Tylenol and Oxy PRN for analgesia.  Bowel regimen discontinued as patient passed 3 BMs since surgery.
continue protonix gi ppx  continue colace for gi px  SCD's and lovenox
S/p C4L 8/30/21.   Neurologically intact.  Hemodynamically stable.  Trending H/H.   Continue lopressor as tolerated by HR and BP.   Continue aspirin for acute graft closure prophylaxis.  Continue statin for chronic graft patency prophylaxis.  Oxygenating well, coughing and deep breathing exercises/incentive spirometry encouraged.  Follow up AM CXR.   Lasix 40mg PO BID for diuresis, replenish electrolytes PRN to keep K>4 and Mg>2.  Strict I/Os to trend urine output.   Continue with PT, increase activity as tolerated  FS AC+HS with coverage for glycemic control.  Tylenol and Oxy PRN for analgesia.  Bowel regimen discontinued as patient passed 3 BMs since surgery.

## 2021-09-03 NOTE — PROGRESS NOTE ADULT - PROBLEM SELECTOR PROBLEM 2
ABLA (acute blood loss anemia)
2019 novel coronavirus disease (COVID-19)
2019 novel coronavirus disease (COVID-19)
ABLA (acute blood loss anemia)

## 2021-09-03 NOTE — PROGRESS NOTE ADULT - ASSESSMENT
65 year old male with type 2 DM, HTN, HLD, carotid stenosis, CAD admitted for 4V CABG.  His diabetes is under adequate control.      1.  Type 2 DM-  Agree with addition of metformin 1000 mg BID in preparation for discharge.   Continue Lantus for now, but this can be discontinued on discharge.  On discharge would start Jardiance 10 mg daily and hold Glimepiride.   Recommend follow up with us in the office in several weeks.    2.  CAD s/p CABG-  continue ASA, statin and metoprolol  3.  HLD-  continue high dose statin therapy.    
65 M PMH CAD, DM (A1C: 6.3) HTN, HLD, Hiatal Hernia, GERD, carotid stenosis (RCA >70%), cataracts 8/30: C4L  8/31 PRBC x1  9/1 plan for transfer to floor   
65 year old male patient with a medical history of CAD, Type 2 DM (HA1C 6.3 on PO medications) HTN, HLD, Hiatal Hernia, GERD, carotid stenosis (RCA >70%), cataracts, now s/p CABG X 4 (LIMA to LAD, SVG to PDA, SVG to OM1 "y"ed to OM3) with b/l SVG harvests on 8/30/21 with Dr. Polanco. Postoperative course significant for ABLA (s/p 1u PRBC and 1u plts postop). 
65 M PMH CAD, DM (A1C: 6.3) HTN, HLD, Hiatal Hernia, GERD, carotid stenosis (RCA >70%), cataracts 8/30: C4L  
65 year old male patient with a medical history of CAD, Type 2 DM (HA1C 6.3 on PO medications) HTN, HLD, Hiatal Hernia, GERD, carotid stenosis (RCA >70%), cataracts, now s/p CABG X 4 (LIMA to LAD, SVG to PDA, SVG to OM1 "y"ed to OM3) with b/l SVG harvests on 8/30/21 with Dr. Polanco. Postoperative course significant for ABLA (s/p 1u PRBC and 1u plts postop).

## 2021-09-03 NOTE — PROGRESS NOTE ADULT - PROBLEM SELECTOR PLAN 2
pt vaccinated
will discuss vaccine status
Hemodynamically stable.  Trending H/H.   S/p 1u PRBC and 1u plts postop.
Hemodynamically stable.  Trending H/H.   S/p 1u PRBC and 1u plts postop.

## 2021-09-03 NOTE — PROGRESS NOTE ADULT - SUBJECTIVE AND OBJECTIVE BOX
POD #4 s/p CABG X 4 (LIMA to LAD, SVG to PDA, SVG to OM1 "y"ed to OM3) with b/l SVG harvests    PAST MEDICAL & SURGICAL HISTORY:  Mixed hyperlipidemia  Essential hypertension  Type 2 diabetes mellitus without complication, without long-term current use of insulin  Cataract  GERD (gastroesophageal reflux disease)  CAD (coronary artery disease)  History of cardiac cath 2021  S/P cataract extraction and insertion of intraocular lens  History of cardiac cath    FAMILY HISTORY:  Family history of cerebrovascular accident (CVA) (Mother)  Family history of diabetes mellitus (Father)  Family history of cardiac disorder (Father)    Brief Hospital Course: 65 year old male patient with a medical history of CAD, Type 2 DM (HA1C 6.3 on PO medications) HTN, HLD, Hiatal Hernia, GERD, carotid stenosis (RCA >70%), cataracts, now s/p CABG X 4 (LIMA to LAD, SVG to PDA, SVG to OM1 "y"ed to OM3) with b/l SVG harvests on 8/30/21 with Dr. Polanco. Postoperative course significant for ABLA (s/p 1u PRBC and 1u plts postop).     Significant recent/past 24 hr events: No overnight events reported.     Subjective: Patient sitting up in chair at the bedside in no acute distress. +Pain controlled. +Passing BMs. +Tolerating diet. Denies fevers, chills, lightheadedness, dizziness, HA, CP, palpitations, SOB, cough, abdominal pain, N/V, diarrhea, numbness/tingling in extremities, or any other acute complaints. ROS negative x 10 systems except as noted above.    MEDICATIONS  (STANDING):  ascorbic acid 500 milliGRAM(s) Oral daily  aspirin 325 milliGRAM(s) Oral daily  atorvastatin 80 milliGRAM(s) Oral at bedtime  enoxaparin Injectable 40 milliGRAM(s) SubCutaneous daily  ferrous    sulfate 325 milliGRAM(s) Oral daily  folic acid 1 milliGRAM(s) Oral daily  furosemide    Tablet 40 milliGRAM(s) Oral two times a day  insulin glargine Injectable (LANTUS) 16 Unit(s) SubCutaneous at bedtime  insulin lispro (ADMELOG) corrective regimen sliding scale   SubCutaneous Before meals and at bedtime  insulin lispro Injectable (ADMELOG) 4 Unit(s) SubCutaneous three times a day before meals  metoprolol tartrate 25 milliGRAM(s) Oral two times a day  pantoprazole    Tablet 40 milliGRAM(s) Oral before breakfast  psyllium Powder 1 Packet(s) Oral two times a day  senna 2 Tablet(s) Oral at bedtime  sodium chloride 0.9% lock flush 3 milliLiter(s) IV Push every 8 hours    MEDICATIONS  (PRN):  acetaminophen   Tablet .. 650 milliGRAM(s) Oral every 4 hours PRN Mild Pain (1 - 3)  ondansetron Injectable 4 milliGRAM(s) IV Push every 4 hours PRN Nausea and/or Vomiting  oxyCODONE    IR 10 milliGRAM(s) Oral every 4 hours PRN Severe Pain (7 - 10)  oxyCODONE    IR 5 milliGRAM(s) Oral every 4 hours PRN Moderate Pain (4 - 6)    Allergies: No Known Allergies    Vitals   T(C): 37.2 (02 Sep 2021 21:40), Max: 37.2 (02 Sep 2021 21:40)  T(F): 98.9 (02 Sep 2021 21:40), Max: 98.9 (02 Sep 2021 21:40)  HR: 94 (02 Sep 2021 21:40) (86 - 94)  BP: 109/68 (02 Sep 2021 21:40) (109/68 - 131/79)  RR: 18 (02 Sep 2021 21:40) (18 - 20)  SpO2: 95% (02 Sep 2021 21:40) (94% - 96%)    I&O's Detail    01 Sep 2021 07:01  -  02 Sep 2021 07:00  --------------------------------------------------------  IN:    IV PiggyBack: 500 mL    IV PiggyBack: 50 mL    Oral Fluid: 1200 mL  Total IN: 1750 mL    OUT:    Voided (mL): 600 mL  Total OUT: 600 mL    Total NET: 1150 mL      02 Sep 2021 07:01  -  03 Sep 2021 03:48  --------------------------------------------------------  IN:    Oral Fluid: 720 mL  Total IN: 720 mL    OUT:    Voided (mL): 1400 mL  Total OUT: 1400 mL    Total NET: -680 mL    Physical Exam  Neuro: A+O x 3, non-focal, speech clear and intact  HEENT:  NCAT, No conjuctival edema or icterus, no thrush.    Neck:  Supple, trachea midline  Pulm: CTA, no rales/rhonchi/wheezing, no accessory muscle use noted  Chest: +PW (isolated)  CV: regular rate, regular rhythm, +S1S2  Abd: soft, NT, ND, + BS  Ext: HALL x 4, no edema, no cyanosis or clubbing, distal motor/neuro/circ intact  Skin: warm, dry, well perfused  Incisions: midsternal mepilex C/D/I, sternum stable, LLE C/D/I w/ dressing and Ace wrap    LABS                        9.4    11.87 )-----------( 198      ( 02 Sep 2021 08:01 )             28.2     09-02    140  |  103  |  17.9  ----------------------------<  128<H>  4.1   |  26.0  |  0.78    Ca    8.9      02 Sep 2021 08:01  Mg     2.1     09-02    POCT Blood Glucose.: 152 mg/dL (09-02-21 @ 20:54)  POCT Blood Glucose.: 147 mg/dL (09-02-21 @ 17:12)  POCT Blood Glucose.: 141 mg/dL (09-02-21 @ 12:12)  POCT Blood Glucose.: 147 mg/dL (09-02-21 @ 08:07)      Last CXR:  < from: Xray Chest 1 View- PORTABLE-Routine (09.02.21 @ 05:53) >  Two expiratory/kyphotic views are compared to 9/1/2021 and demonstrate a normal cardiac silhouette and normal pulmonary vasculature with no gross consolidation, effusion, right pneumothorax or acute osseous finding.  Minimal left apical pneumothorax, unchanged  Poststernotomy/CABG.  IMPRESSION:  Minimal left apicalpneumothorax, unchanged  < end of copied text >  
CC: follow up of DM    Interval HPI/ Overnight Events:  Patient denies any CP or dyspnea.  Good appetite.   Planning for D/C tomorrow.  Prandial lispro was D/C'ed today and being restarted on Metformin.      MEDICATIONS  (STANDING):  ascorbic acid 500 milliGRAM(s) Oral daily  aspirin 325 milliGRAM(s) Oral daily  atorvastatin 80 milliGRAM(s) Oral at bedtime  enoxaparin Injectable 40 milliGRAM(s) SubCutaneous daily  ferrous    sulfate 325 milliGRAM(s) Oral daily  folic acid 1 milliGRAM(s) Oral daily  furosemide    Tablet 40 milliGRAM(s) Oral two times a day  glucagon  Injectable 1 milliGRAM(s) IntraMuscular once  insulin glargine Injectable (LANTUS) 16 Unit(s) SubCutaneous at bedtime  insulin lispro (ADMELOG) corrective regimen sliding scale   SubCutaneous Before meals and at bedtime  metFORMIN 1000 milliGRAM(s) Oral two times a day  metoprolol tartrate 25 milliGRAM(s) Oral two times a day  pantoprazole    Tablet 40 milliGRAM(s) Oral before breakfast  potassium chloride    Tablet ER 20 milliEquivalent(s) Oral daily  psyllium Powder 1 Packet(s) Oral two times a day  sodium chloride 0.9% lock flush 3 milliLiter(s) IV Push every 8 hours      Vitals:  Vital Signs Last 24 Hrs  T(C): 36.8 (03 Sep 2021 09:53), Max: 37.2 (02 Sep 2021 21:40)  T(F): 98.3 (03 Sep 2021 09:53), Max: 98.9 (02 Sep 2021 21:40)  HR: 82 (03 Sep 2021 11:30) (82 - 94)  BP: 124/79 (03 Sep 2021 11:30) (109/68 - 138/82)  RR: 20 (03 Sep 2021 11:30) (18 - 20)  SpO2: 97% (03 Sep 2021 11:30) (94% - 98%)    PE:  Gen: AAO, NAD  HEENT:  sclera anicteric,   MMM  Neck:  no thyromegaly, no LAD  CV:  nl S1 + S2, RRR, no m/r/g  Resp:  nl respiratory effort, CTA b/l  GI/ Abd: soft, NT/ ND, BS +  Neuro:  No tremor   MS:  no c/c/e, nl muscle tone  Skin:  no acanthosis  Psych:  affect appropriate    LABS:  09-03    140  |  102  |  15.9  ----------------------------<  105<H>  3.9   |  27.0  |  0.81    Ca    8.7      03 Sep 2021 05:53  Mg     2.0     09-03                              8.8    9.12  )-----------( 219      ( 03 Sep 2021 05:53 )             26.0       CAPILLARY BLOOD GLUCOSE  POCT Blood Glucose.: 120 mg/dL (03 Sep 2021 12:14)  POCT Blood Glucose.: 129 mg/dL (03 Sep 2021 07:58)  POCT Blood Glucose.: 152 mg/dL (02 Sep 2021 20:54)  POCT Blood Glucose.: 147 mg/dL (02 Sep 2021 17:12)  
No evidence of anesthesia-related complications.
  POD #3 s/p CABG X 4 (LIMA to LAD, SVG to PDA, SVG to OM1 "y"ed to OM3) with b/l SVG harvests    PAST MEDICAL & SURGICAL HISTORY:  Mixed hyperlipidemia  Essential hypertension  Type 2 diabetes mellitus without complication, without long-term current use of insulin  Cataract  GERD (gastroesophageal reflux disease)  CAD (coronary artery disease)  History of cardiac cath 2021  S/P cataract extraction and insertion of intraocular lens  History of cardiac cath    FAMILY HISTORY:  Family history of cerebrovascular accident (CVA) (Mother)  Family history of diabetes mellitus (Father)  Family history of cardiac disorder (Father)    Brief Hospital Course: 65 year old male patient with a medical history of CAD, Type 2 DM (HA1C 6.3 on PO medications) HTN, HLD, Hiatal Hernia, GERD, carotid stenosis (RCA >70%), cataracts, now s/p CABG X 4 (LIMA to LAD, SVG to PDA, SVG to OM1 "y"ed to OM3) with b/l SVG harvests on 8/30/21 with Dr. Polanco. Postoperative course significant for ABLA (s/p 1u PRBC and 1u plts postop).     Significant recent/past 24 hr events: No overnight events reported.     Subjective: Patient sitting up in chair at the bedside in no acute distress. +Pain controlled. +Passing BMs. +Tolerating diet. Denies fevers, chills, lightheadedness, dizziness, HA, CP, palpitations, SOB, cough, abdominal pain, N/V, diarrhea, numbness/tingling in extremities, or any other acute complaints. ROS negative x 10 systems except as noted above.    MEDICATIONS  (STANDING):  ascorbic acid 500 milliGRAM(s) Oral daily  aspirin 325 milliGRAM(s) Oral daily  atorvastatin 80 milliGRAM(s) Oral at bedtime  enoxaparin Injectable 40 milliGRAM(s) SubCutaneous daily  ferrous    sulfate 325 milliGRAM(s) Oral daily  folic acid 1 milliGRAM(s) Oral daily  furosemide    Tablet 40 milliGRAM(s) Oral two times a day  insulin glargine Injectable (LANTUS) 16 Unit(s) SubCutaneous at bedtime  insulin lispro (ADMELOG) corrective regimen sliding scale   SubCutaneous Before meals and at bedtime  insulin lispro Injectable (ADMELOG) 4 Unit(s) SubCutaneous three times a day before meals  metoprolol tartrate 25 milliGRAM(s) Oral two times a day  pantoprazole    Tablet 40 milliGRAM(s) Oral before breakfast  sodium chloride 0.9% lock flush 3 milliLiter(s) IV Push every 8 hours    MEDICATIONS  (PRN):  acetaminophen   Tablet .. 650 milliGRAM(s) Oral every 4 hours PRN Mild Pain (1 - 3)  ondansetron Injectable 4 milliGRAM(s) IV Push every 4 hours PRN Nausea and/or Vomiting  oxyCODONE    IR 5 milliGRAM(s) Oral every 4 hours PRN Moderate Pain (4 - 6)  oxyCODONE    IR 10 milliGRAM(s) Oral every 4 hours PRN Severe Pain (7 - 10)    Allergies: No Known Allergies    Vitals   T(C): 37 (01 Sep 2021 21:38), Max: 37.2 (01 Sep 2021 06:00)  T(F): 98.6 (01 Sep 2021 21:38), Max: 99 (01 Sep 2021 07:00)  HR: 84 (01 Sep 2021 21:38) (82 - 95)  BP: 103/69 (01 Sep 2021 21:38) (100/65 - 112/70)  BP(mean): 76 (01 Sep 2021 08:56) (76 - 81)  RR: 18 (01 Sep 2021 21:38) (12 - 20)  SpO2: 92% (01 Sep 2021 21:38) (92% - 97%)    I&O's Detail    31 Aug 2021 07:01  -  01 Sep 2021 07:00  --------------------------------------------------------  IN:    Insulin: 6 mL    IV PiggyBack: 750 mL    IV PiggyBack: 150 mL    Oral Fluid: 1200 mL    PRBCs (Packed Red Blood Cells): 162 mL    sodium chloride 0.9%: 25 mL    sodium chloride 0.9%: 60 mL  Total IN: 2353 mL    OUT:    Chest Tube (mL): 60 mL    Chest Tube (mL): 280 mL    Indwelling Catheter - Urethral (mL): 1860 mL    Norepinephrine: 0 mL  Total OUT: 2200 mL    Total NET: 153 mL      01 Sep 2021 07:01  -  02 Sep 2021 05:02  --------------------------------------------------------  IN:    IV PiggyBack: 500 mL    IV PiggyBack: 50 mL    Oral Fluid: 1200 mL  Total IN: 1750 mL    OUT:    Voided (mL): 600 mL  Total OUT: 600 mL    Total NET: 1150 mL    Physical Exam  Neuro: A+O x 3, non-focal, speech clear and intact  HEENT:  NCAT, No conjuctival edema or icterus, no thrush.    Neck:  Supple, trachea midline  Pulm: CTA, no rales/rhonchi/wheezing, no accessory muscle use noted  Chest: +PW (settings: VVI, rate: 40, mA: 10, sensitivity: 1.0)  CV: regular rate, regular rhythm, +S1S2  Abd: soft, NT, ND, + BS  Ext: HALL x 4, no edema, b/l LE with Ace wrap, no cyanosis or clubbing, distal motor/neuro/circ intact  Skin: warm, dry, well perfused  Incisions: midsternal mepilex C/D/I, sternum stable, LLE C/D/I w/ dressing and Ace wrap    LABS                        8.1    12.37 )-----------( 154      ( 01 Sep 2021 03:14 )             24.3     09-01    137  |  104  |  19.4  ----------------------------<  151<H>  4.8   |  25.0  |  0.77    Ca    7.9<L>      01 Sep 2021 03:14  Mg     2.2     09-01    POCT Blood Glucose.: 148 mg/dL (09-01-21 @ 21:41)  POCT Blood Glucose.: 155 mg/dL (09-01-21 @ 17:10)  POCT Blood Glucose.: 257 mg/dL (09-01-21 @ 12:52)  POCT Blood Glucose.: 151 mg/dL (09-01-21 @ 07:27)      Last CXR:  < from: Xray Chest 1 View- PORTABLE-Routine (09.01.21 @ 04:52) >  Two views of the chest are compared to 8/31/2021 and demonstrate that the mediastinal drains have been removed, no pneumomediastinum..  A curvilinear interface at the left apex consistent with a minimal pneumothorax is unchanged  Cardiac silhouette and pulmonary vasculature are within normal limits with mild platelike atelectasis at the left base.  No lobar consolidation, effusion or acute osseous finding.  Post sternotomy/CABG.  IMPRESSION:  Mediastinal drains removed, small left apical pneumothorax, unchanged. No pneumomediastinum  < end of copied text >  
Subjective: no c/o incisional pain at this time. Denies CP, SOB, palpitations, N/V, other c/o.    T(C): 37.6 (08-31-21 @ 00:00), Max: 37.7 (08-30-21 @ 22:00)  HR: 93 (08-31-21 @ 00:30) (65 - 97)  BP: 114/70 (08-30-21 @ 06:01) (114/70 - 114/70)  ABP: 114/70 (08-31-21 @ 00:30) (93/51 - 167/80)  ABP(mean): 88 (08-31-21 @ 00:30) (66 - 106)  RR: 12 (08-31-21 @ 00:30) (7 - 22)  SpO2: 94% (08-31-21 @ 00:30) (93% - 100%)  Wt(kg): --  CVP(mm Hg): 8 (08-31-21 @ 00:30) (2 - 16)  CO: --  CI: --  PA: --   Mode: AC/ CMV (Assist Control/ Continuous Mandatory Ventilation)  RR (machine): 10  TV (machine): 700  FiO2: 40  PEEP: 5  PS: 5  MAP: 11  PIP: 22      I&O's Detail    30 Aug 2021 07:01  -  31 Aug 2021 00:47  --------------------------------------------------------  IN:    Albumin 5%  - 250 mL: 500 mL    Insulin: 51.5 mL    IV PiggyBack: 100 mL    IV PiggyBack: 500 mL    IV PiggyBack: 150 mL    IV PiggyBack: 50 mL    IV PiggyBack: 50 mL    Lactated Ringers: 1000 mL    Norepinephrine: 42 mL    Oral Fluid: 200 mL    Platelets - Single Donor: 217 mL    PRBCs (Packed Red Blood Cells): 292 mL    Propofol: 8.4 mL    sodium chloride 0.9%: 60 mL    sodium chloride 0.9%: 210 mL  Total IN: 3430.9 mL    OUT:    Chest Tube (mL): 650 mL    Chest Tube (mL): 720 mL    Indwelling Catheter - Urethral (mL): 1045 mL  Total OUT: 2415 mL    Total NET: 1015.9 mL          LABS: All Lab data reviewed and analyzed                        9.6    12.48 )-----------( 165      ( 30 Aug 2021 13:15 )             27.2     08-30    141  |  107  |  15.5  ----------------------------<  151<H>  3.8   |  22.0  |  0.73    Ca    8.6      30 Aug 2021 13:15  Mg     2.6     08-30    TPro  5.2<L>  /  Alb  3.5  /  TBili  0.8  /  DBili  x   /  AST  55<H>  /  ALT  19  /  AlkPhos  27<L>  08-30    PT/INR - ( 30 Aug 2021 13:15 )   PT: 13.2 sec;   INR: 1.15 ratio         PTT - ( 30 Aug 2021 13:15 )  PTT:22.9 sec  CARDIAC MARKERS ( 30 Aug 2021 13:15 )   U/L / CKMB56.3 ng/mL / Troponin T1.06 ng/mL /      ABG - ( 30 Aug 2021 16:07 )  pH, Arterial: 7.370 pH, Blood: x     /  pCO2: 41    /  pO2: 152   / HCO3: 24    / Base Excess: -1.6  /  SaO2: 100.0             CAPILLARY BLOOD GLUCOSE      POCT Blood Glucose.: 115 mg/dL (31 Aug 2021 00:00)  POCT Blood Glucose.: 118 mg/dL (30 Aug 2021 22:59)  POCT Blood Glucose.: 128 mg/dL (30 Aug 2021 21:49)  POCT Blood Glucose.: 141 mg/dL (30 Aug 2021 21:00)  POCT Blood Glucose.: 148 mg/dL (30 Aug 2021 20:06)  POCT Blood Glucose.: 175 mg/dL (30 Aug 2021 18:57)  POCT Blood Glucose.: 160 mg/dL (30 Aug 2021 18:13)  POCT Blood Glucose.: 142 mg/dL (30 Aug 2021 17:00)  POCT Blood Glucose.: 119 mg/dL (30 Aug 2021 15:57)  POCT Blood Glucose.: 112 mg/dL (30 Aug 2021 15:04)  POCT Blood Glucose.: 127 mg/dL (30 Aug 2021 14:11)  POCT Blood Glucose.: 165 mg/dL (30 Aug 2021 13:02)  POCT Blood Glucose.: 109 mg/dL (30 Aug 2021 06:12)           RADIOLOGY: - Reviewed and analyzed   CXR: pending    HOSPITAL MEDICATIONS: All medications reviewed and analyzed  MEDICATIONS  (STANDING):  albumin human  5% IVPB 250 milliLiter(s) IV Intermittent once  aspirin 325 milliGRAM(s) Oral daily  atorvastatin 80 milliGRAM(s) Oral at bedtime  cefuroxime  IVPB 1500 milliGRAM(s) IV Intermittent every 8 hours  chlorhexidine 2% Cloths 1 Application(s) Topical daily  dextrose 50% Injectable 50 milliLiter(s) IV Push every 15 minutes  dextrose 50% Injectable 25 milliLiter(s) IV Push every 15 minutes  enoxaparin Injectable 40 milliGRAM(s) SubCutaneous daily  insulin regular Infusion 3 Unit(s)/Hr (3 mL/Hr) IV Continuous <Continuous>  lactated ringers. 1000 milliLiter(s) (500 mL/Hr) IV Continuous <Continuous>  metoprolol tartrate 25 milliGRAM(s) Oral two times a day  norepinephrine Infusion 0.05 MICROgram(s)/kG/Min (8.76 mL/Hr) IV Continuous <Continuous>  pantoprazole    Tablet 40 milliGRAM(s) Oral before breakfast  polyethylene glycol 3350 17 Gram(s) Oral daily  potassium chloride  10 mEq/50 mL IVPB 10 milliEquivalent(s) IV Intermittent every 1 hour  potassium chloride  10 mEq/50 mL IVPB 10 milliEquivalent(s) IV Intermittent every 1 hour  potassium chloride  10 mEq/50 mL IVPB 10 milliEquivalent(s) IV Intermittent every 1 hour  senna 2 Tablet(s) Oral at bedtime  sodium chloride 0.9%. 1000 milliLiter(s) (10 mL/Hr) IV Continuous <Continuous>  sodium chloride 0.9%. 1000 milliLiter(s) (10 mL/Hr) IV Continuous <Continuous>  vancomycin  IVPB 1500 milliGRAM(s) IV Intermittent every 12 hours    MEDICATIONS  (PRN):  acetaminophen   Tablet .. 650 milliGRAM(s) Oral every 4 hours PRN Mild Pain (1 - 3)  ondansetron Injectable 4 milliGRAM(s) IV Push every 4 hours PRN Nausea and/or Vomiting  oxyCODONE    IR 5 milliGRAM(s) Oral every 4 hours PRN Moderate Pain (4 - 6)  oxyCODONE    IR 10 milliGRAM(s) Oral every 4 hours PRN Severe Pain (7 - 10)          Lines: right IJ intro , right radial antonio     Physical Exam  Neuro: A+O x 3, non-focal, speech clear and intact  HEENT:  NCAT, PERRL, EOMI. No conjuctival edema or iIcterus, no thrush.  No ETT or NGT/OGT  Neck:  ROM intact, no JVD, no nodes or masses palpable, trachea midline, no tracheostomy  Pulm: CTA, good air entry, equal bilaterally, no rales/rhonchi/wheezing, no accessory muscle use noted  Chest:        mediastinal CT and left pleural CT all with dressings intact and no air leak, no subQ emphysema       +PW attatched to pacing box  CV: RRR, S1, S2. No murmurs, rubs, or gallops noted.  Abd: +BSx4. Soft, nontender, nondistended.  : trevino in situ to bedside drainage  Ext: HALL x 4, no edema, no cyanosis or clubbing, distal motor/neuro/circ intact  Skin: warm, dry, no rashes  Incisions: midsternal C/D/I/stable w/ dressing        Case including assessment/plan of care discussed with   CT surgery attending.  Critical Care time:   35   minutes of noncontinuos critical care time spent evaluating/treating, reviewing imaging, labs, discussing case with multidisciplinary team, discussing plans/goals of care with patient/family to prevent further life threatening depreciation of the patient's condition. Non-inclusive is procedure time.       65yMale with PMH     CABG, with [facility]        PAST MEDICAL & SURGICAL HISTORY:  Mixed hyperlipidemia    Essential hypertension    Type 2 diabetes mellitus without complication, without long-term current use of insulin    Cataract    GERD (gastroesophageal reflux disease)    CAD (coronary artery disease)    History of cardiac cath  2021    S/P cataract extraction and insertion of intraocular lens    History of cardiac cath    
Subjective: no c/o incisional pain at this time. Denies CP, SOB, palpitations, N/V, other c/o.    T(C): 37.2 (09-01-21 @ 02:00), Max: 38.1 (08-31-21 @ 06:00)  HR: 84 (09-01-21 @ 02:00) (83 - 94)  BP: 93/58 (09-01-21 @ 02:00) (93/58 - 111/64)  ABP: --  ABP(mean): --  RR: 15 (09-01-21 @ 02:00) (13 - 27)  SpO2: 93% (09-01-21 @ 02:00) (93% - 100%)  Wt(kg): --  CVP(mm Hg): 11 (08-31-21 @ 12:00) (4 - 12)  CO: --  CI: --  PA: --       I&O's Detail    30 Aug 2021 07:01  -  31 Aug 2021 07:00  --------------------------------------------------------  IN:    Albumin 5%  - 250 mL: 500 mL    Insulin: 65.5 mL    IV PiggyBack: 50 mL    IV PiggyBack: 100 mL    IV PiggyBack: 500 mL    IV PiggyBack: 150 mL    IV PiggyBack: 50 mL    Lactated Ringers: 1000 mL    Norepinephrine: 42 mL    Oral Fluid: 600 mL    Platelets - Single Donor: 217 mL    PRBCs (Packed Red Blood Cells): 442 mL    Propofol: 8.4 mL    sodium chloride 0.9%: 125 mL    sodium chloride 0.9%: 280 mL  Total IN: 4129.9 mL    OUT:    Chest Tube (mL): 870 mL    Chest Tube (mL): 880 mL    Indwelling Catheter - Urethral (mL): 1480 mL  Total OUT: 3230 mL    Total NET: 899.9 mL      31 Aug 2021 07:01  -  01 Sep 2021 05:40  --------------------------------------------------------  IN:    Insulin: 6 mL    IV PiggyBack: 750 mL    IV PiggyBack: 150 mL    Oral Fluid: 1080 mL    PRBCs (Packed Red Blood Cells): 162 mL    sodium chloride 0.9%: 25 mL    sodium chloride 0.9%: 60 mL  Total IN: 2233 mL    OUT:    Chest Tube (mL): 60 mL    Chest Tube (mL): 280 mL    Indwelling Catheter - Urethral (mL): 1860 mL    Norepinephrine: 0 mL  Total OUT: 2200 mL    Total NET: 33 mL          LABS: All Lab data reviewed and analyzed                        8.1    12.37 )-----------( 154      ( 01 Sep 2021 03:14 )             24.3     09-01    137  |  104  |  19.4  ----------------------------<  151<H>  4.8   |  25.0  |  0.77    Ca    7.9<L>      01 Sep 2021 03:14  Mg     2.2     09-01    TPro  4.9<L>  /  Alb  3.3  /  TBili  0.6  /  DBili  x   /  AST  47<H>  /  ALT  15  /  AlkPhos  21<L>  08-31    PT/INR - ( 30 Aug 2021 13:15 )   PT: 13.2 sec;   INR: 1.15 ratio         PTT - ( 30 Aug 2021 13:15 )  PTT:22.9 sec      ABG - ( 30 Aug 2021 16:07 )  pH, Arterial: 7.370 pH, Blood: x     /  pCO2: 41    /  pO2: 152   / HCO3: 24    / Base Excess: -1.6  /  SaO2: 100.0             CAPILLARY BLOOD GLUCOSE      POCT Blood Glucose.: 173 mg/dL (31 Aug 2021 22:08)  POCT Blood Glucose.: 274 mg/dL (31 Aug 2021 16:21)  POCT Blood Glucose.: 170 mg/dL (31 Aug 2021 11:30)  POCT Blood Glucose.: 159 mg/dL (31 Aug 2021 09:57)  POCT Blood Glucose.: 149 mg/dL (31 Aug 2021 07:58)  POCT Blood Glucose.: 150 mg/dL (31 Aug 2021 05:49)           RADIOLOGY: - Reviewed and analyzed   CXR: pending    HOSPITAL MEDICATIONS: All medications reviewed and analyzed  MEDICATIONS  (STANDING):  albumin human  5% IVPB 250 milliLiter(s) IV Intermittent once  ascorbic acid 500 milliGRAM(s) Oral daily  aspirin 325 milliGRAM(s) Oral daily  atorvastatin 80 milliGRAM(s) Oral at bedtime  cefuroxime  IVPB 1500 milliGRAM(s) IV Intermittent every 8 hours  chlorhexidine 2% Cloths 1 Application(s) Topical daily  dextrose 40% Gel 15 Gram(s) Oral once  dextrose 5%. 1000 milliLiter(s) (50 mL/Hr) IV Continuous <Continuous>  dextrose 5%. 1000 milliLiter(s) (100 mL/Hr) IV Continuous <Continuous>  dextrose 50% Injectable 25 Gram(s) IV Push once  dextrose 50% Injectable 12.5 Gram(s) IV Push once  dextrose 50% Injectable 25 Gram(s) IV Push once  enoxaparin Injectable 40 milliGRAM(s) SubCutaneous daily  ferrous    sulfate 325 milliGRAM(s) Oral daily  folic acid 1 milliGRAM(s) Oral daily  furosemide    Tablet 40 milliGRAM(s) Oral daily  glucagon  Injectable 1 milliGRAM(s) IntraMuscular once  insulin glargine Injectable (LANTUS) 16 Unit(s) SubCutaneous at bedtime  insulin lispro (ADMELOG) corrective regimen sliding scale   SubCutaneous Before meals and at bedtime  insulin lispro Injectable (ADMELOG) 4 Unit(s) SubCutaneous three times a day before meals  lactated ringers. 1000 milliLiter(s) (500 mL/Hr) IV Continuous <Continuous>  metoprolol tartrate 25 milliGRAM(s) Oral two times a day  pantoprazole    Tablet 40 milliGRAM(s) Oral before breakfast  polyethylene glycol 3350 17 Gram(s) Oral daily  senna 2 Tablet(s) Oral at bedtime  sodium chloride 0.9% lock flush 3 milliLiter(s) IV Push every 8 hours  sodium chloride 0.9%. 1000 milliLiter(s) (10 mL/Hr) IV Continuous <Continuous>  sodium chloride 0.9%. 1000 milliLiter(s) (10 mL/Hr) IV Continuous <Continuous>  vancomycin  IVPB 1500 milliGRAM(s) IV Intermittent every 12 hours    MEDICATIONS  (PRN):  acetaminophen   Tablet .. 650 milliGRAM(s) Oral every 4 hours PRN Mild Pain (1 - 3)  ondansetron Injectable 4 milliGRAM(s) IV Push every 4 hours PRN Nausea and/or Vomiting  oxyCODONE    IR 5 milliGRAM(s) Oral every 4 hours PRN Moderate Pain (4 - 6)  oxyCODONE    IR 10 milliGRAM(s) Oral every 4 hours PRN Severe Pain (7 - 10)          Lines:     Physical Exam  Neuro: A+O x 3, non-focal, speech clear and intact  HEENT:  NCAT, PERRL, EOMI. No conjuctival edema or iIcterus, no thrush.  No ETT or NGT/OGT  Neck:  ROM intact, no JVD, no nodes or masses palpable, trachea midline, no tracheostomy  Pulm: CTA, good air entry, equal bilaterally, no rales/rhonchi/wheezing, no accessory muscle use noted  Chest:         +PW attatched to pacing box  CV: RRR, S1, S2. No murmurs, rubs, or gallops noted.  Abd: +BSx4. Soft, nontender, nondistended.  : trevino in situ to bedside drainage  Ext: HALL x 4, no edema, no cyanosis or clubbing, distal motor/neuro/circ intact  Skin: warm, dry, no rashes  Incisions: midsternal C/D/I/stable w/ dressing        Case including assessment/plan of care discussed with   CT surgery attending.  Critical Care time:  35    minutes of noncontinuos critical care time spent evaluating/treating, reviewing imaging, labs, discussing case with multidisciplinary team, discussing plans/goals of care with patient/family to prevent further life threatening depreciation of the patient's condition. Non-inclusive is procedure time.       65yMale with PMH     CABG, with MILTON        PAST MEDICAL & SURGICAL HISTORY:  Mixed hyperlipidemia    Essential hypertension    Type 2 diabetes mellitus without complication, without long-term current use of insulin    Cataract    GERD (gastroesophageal reflux disease)    CAD (coronary artery disease)    History of cardiac cath  2021    S/P cataract extraction and insertion of intraocular lens    History of cardiac cath

## 2021-09-03 NOTE — PROGRESS NOTE ADULT - PROBLEM SELECTOR PLAN 4
Patient vaccinated.
Patient vaccinated.
premeal and lantus as per CTS protocol
insulin gtt  transition to premeal and lantus as per CTS protocol

## 2021-09-04 ENCOUNTER — TRANSCRIPTION ENCOUNTER (OUTPATIENT)
Age: 66
End: 2021-09-04

## 2021-09-04 VITALS
DIASTOLIC BLOOD PRESSURE: 68 MMHG | OXYGEN SATURATION: 97 % | TEMPERATURE: 98 F | RESPIRATION RATE: 18 BRPM | SYSTOLIC BLOOD PRESSURE: 112 MMHG | HEART RATE: 91 BPM

## 2021-09-04 LAB
ANION GAP SERPL CALC-SCNC: 13 MMOL/L — SIGNIFICANT CHANGE UP (ref 5–17)
BUN SERPL-MCNC: 14.4 MG/DL — SIGNIFICANT CHANGE UP (ref 8–20)
CALCIUM SERPL-MCNC: 9.3 MG/DL — SIGNIFICANT CHANGE UP (ref 8.6–10.2)
CHLORIDE SERPL-SCNC: 101 MMOL/L — SIGNIFICANT CHANGE UP (ref 98–107)
CO2 SERPL-SCNC: 27 MMOL/L — SIGNIFICANT CHANGE UP (ref 22–29)
CREAT SERPL-MCNC: 0.91 MG/DL — SIGNIFICANT CHANGE UP (ref 0.5–1.3)
GLUCOSE BLDC GLUCOMTR-MCNC: 131 MG/DL — HIGH (ref 70–99)
GLUCOSE BLDC GLUCOMTR-MCNC: 135 MG/DL — HIGH (ref 70–99)
GLUCOSE SERPL-MCNC: 119 MG/DL — HIGH (ref 70–99)
HCT VFR BLD CALC: 26.8 % — LOW (ref 39–50)
HGB BLD-MCNC: 8.9 G/DL — LOW (ref 13–17)
MAGNESIUM SERPL-MCNC: 1.9 MG/DL — SIGNIFICANT CHANGE UP (ref 1.6–2.6)
MCHC RBC-ENTMCNC: 30.2 PG — SIGNIFICANT CHANGE UP (ref 27–34)
MCHC RBC-ENTMCNC: 33.2 GM/DL — SIGNIFICANT CHANGE UP (ref 32–36)
MCV RBC AUTO: 90.8 FL — SIGNIFICANT CHANGE UP (ref 80–100)
PLATELET # BLD AUTO: 248 K/UL — SIGNIFICANT CHANGE UP (ref 150–400)
POTASSIUM SERPL-MCNC: 4.1 MMOL/L — SIGNIFICANT CHANGE UP (ref 3.5–5.3)
POTASSIUM SERPL-SCNC: 4.1 MMOL/L — SIGNIFICANT CHANGE UP (ref 3.5–5.3)
RBC # BLD: 2.95 M/UL — LOW (ref 4.2–5.8)
RBC # FLD: 13.5 % — SIGNIFICANT CHANGE UP (ref 10.3–14.5)
SODIUM SERPL-SCNC: 141 MMOL/L — SIGNIFICANT CHANGE UP (ref 135–145)
WBC # BLD: 8.01 K/UL — SIGNIFICANT CHANGE UP (ref 3.8–10.5)
WBC # FLD AUTO: 8.01 K/UL — SIGNIFICANT CHANGE UP (ref 3.8–10.5)

## 2021-09-04 PROCEDURE — 93320 DOPPLER ECHO COMPLETE: CPT

## 2021-09-04 PROCEDURE — P9100: CPT

## 2021-09-04 PROCEDURE — 82962 GLUCOSE BLOOD TEST: CPT

## 2021-09-04 PROCEDURE — 97163 PT EVAL HIGH COMPLEX 45 MIN: CPT

## 2021-09-04 PROCEDURE — 93005 ELECTROCARDIOGRAM TRACING: CPT

## 2021-09-04 PROCEDURE — C1751: CPT

## 2021-09-04 PROCEDURE — 85025 COMPLETE CBC W/AUTO DIFF WBC: CPT

## 2021-09-04 PROCEDURE — 86901 BLOOD TYPING SEROLOGIC RH(D): CPT

## 2021-09-04 PROCEDURE — 85014 HEMATOCRIT: CPT

## 2021-09-04 PROCEDURE — 80053 COMPREHEN METABOLIC PANEL: CPT

## 2021-09-04 PROCEDURE — 82553 CREATINE MB FRACTION: CPT

## 2021-09-04 PROCEDURE — 36415 COLL VENOUS BLD VENIPUNCTURE: CPT

## 2021-09-04 PROCEDURE — 84484 ASSAY OF TROPONIN QUANT: CPT

## 2021-09-04 PROCEDURE — 83735 ASSAY OF MAGNESIUM: CPT

## 2021-09-04 PROCEDURE — 82435 ASSAY OF BLOOD CHLORIDE: CPT

## 2021-09-04 PROCEDURE — P9037: CPT

## 2021-09-04 PROCEDURE — 82550 ASSAY OF CK (CPK): CPT

## 2021-09-04 PROCEDURE — 82803 BLOOD GASES ANY COMBINATION: CPT

## 2021-09-04 PROCEDURE — 84295 ASSAY OF SERUM SODIUM: CPT

## 2021-09-04 PROCEDURE — 93325 DOPPLER ECHO COLOR FLOW MAPG: CPT

## 2021-09-04 PROCEDURE — 85610 PROTHROMBIN TIME: CPT

## 2021-09-04 PROCEDURE — 85730 THROMBOPLASTIN TIME PARTIAL: CPT

## 2021-09-04 PROCEDURE — 84132 ASSAY OF SERUM POTASSIUM: CPT

## 2021-09-04 PROCEDURE — P9016: CPT

## 2021-09-04 PROCEDURE — 86850 RBC ANTIBODY SCREEN: CPT

## 2021-09-04 PROCEDURE — 83605 ASSAY OF LACTIC ACID: CPT

## 2021-09-04 PROCEDURE — 85027 COMPLETE CBC AUTOMATED: CPT

## 2021-09-04 PROCEDURE — 71045 X-RAY EXAM CHEST 1 VIEW: CPT

## 2021-09-04 PROCEDURE — 36430 TRANSFUSION BLD/BLD COMPNT: CPT

## 2021-09-04 PROCEDURE — C1889: CPT

## 2021-09-04 PROCEDURE — P9045: CPT

## 2021-09-04 PROCEDURE — 80048 BASIC METABOLIC PNL TOTAL CA: CPT

## 2021-09-04 PROCEDURE — 71045 X-RAY EXAM CHEST 1 VIEW: CPT | Mod: 26

## 2021-09-04 PROCEDURE — 93312 ECHO TRANSESOPHAGEAL: CPT

## 2021-09-04 PROCEDURE — 94002 VENT MGMT INPAT INIT DAY: CPT

## 2021-09-04 PROCEDURE — 86769 SARS-COV-2 COVID-19 ANTIBODY: CPT

## 2021-09-04 PROCEDURE — 85018 HEMOGLOBIN: CPT

## 2021-09-04 PROCEDURE — 86900 BLOOD TYPING SEROLOGIC ABO: CPT

## 2021-09-04 PROCEDURE — 82947 ASSAY GLUCOSE BLOOD QUANT: CPT

## 2021-09-04 PROCEDURE — 82330 ASSAY OF CALCIUM: CPT

## 2021-09-04 RX ORDER — LISINOPRIL 2.5 MG/1
1 TABLET ORAL
Qty: 0 | Refills: 0 | DISCHARGE

## 2021-09-04 RX ORDER — METOPROLOL TARTRATE 50 MG
1 TABLET ORAL
Qty: 60 | Refills: 0
Start: 2021-09-04 | End: 2021-10-03

## 2021-09-04 RX ORDER — MAGNESIUM SULFATE 500 MG/ML
1 VIAL (ML) INJECTION ONCE
Refills: 0 | Status: COMPLETED | OUTPATIENT
Start: 2021-09-04 | End: 2021-09-04

## 2021-09-04 RX ORDER — FUROSEMIDE 40 MG
1 TABLET ORAL
Qty: 14 | Refills: 0
Start: 2021-09-04 | End: 2021-09-17

## 2021-09-04 RX ORDER — ASCORBIC ACID 60 MG
1 TABLET,CHEWABLE ORAL
Qty: 30 | Refills: 0
Start: 2021-09-04 | End: 2021-10-03

## 2021-09-04 RX ORDER — POTASSIUM CHLORIDE 20 MEQ
1 PACKET (EA) ORAL
Qty: 14 | Refills: 0
Start: 2021-09-04 | End: 2021-09-17

## 2021-09-04 RX ORDER — METFORMIN HYDROCHLORIDE 850 MG/1
1 TABLET ORAL
Qty: 60 | Refills: 0
Start: 2021-09-04 | End: 2021-10-03

## 2021-09-04 RX ORDER — FERROUS SULFATE 325(65) MG
1 TABLET ORAL
Qty: 30 | Refills: 0
Start: 2021-09-04 | End: 2021-10-03

## 2021-09-04 RX ORDER — GLIMEPIRIDE 1 MG
1 TABLET ORAL
Qty: 0 | Refills: 0 | DISCHARGE

## 2021-09-04 RX ORDER — EMPAGLIFLOZIN 10 MG/1
1 TABLET, FILM COATED ORAL
Qty: 30 | Refills: 0
Start: 2021-09-04 | End: 2021-10-03

## 2021-09-04 RX ORDER — ASPIRIN/CALCIUM CARB/MAGNESIUM 324 MG
0 TABLET ORAL
Qty: 0 | Refills: 0 | DISCHARGE

## 2021-09-04 RX ORDER — ASPIRIN/CALCIUM CARB/MAGNESIUM 324 MG
1 TABLET ORAL
Qty: 30 | Refills: 0
Start: 2021-09-04 | End: 2021-10-03

## 2021-09-04 RX ORDER — FOLIC ACID 0.8 MG
1 TABLET ORAL
Qty: 30 | Refills: 0
Start: 2021-09-04 | End: 2021-10-03

## 2021-09-04 RX ADMIN — Medication 1 PACKET(S): at 05:41

## 2021-09-04 RX ADMIN — Medication 650 MILLIGRAM(S): at 06:10

## 2021-09-04 RX ADMIN — Medication 20 MILLIEQUIVALENT(S): at 08:12

## 2021-09-04 RX ADMIN — SODIUM CHLORIDE 3 MILLILITER(S): 9 INJECTION INTRAMUSCULAR; INTRAVENOUS; SUBCUTANEOUS at 05:44

## 2021-09-04 RX ADMIN — PANTOPRAZOLE SODIUM 40 MILLIGRAM(S): 20 TABLET, DELAYED RELEASE ORAL at 08:12

## 2021-09-04 RX ADMIN — Medication 325 MILLIGRAM(S): at 08:12

## 2021-09-04 RX ADMIN — Medication 1 MILLIGRAM(S): at 08:12

## 2021-09-04 RX ADMIN — Medication 100 GRAM(S): at 10:32

## 2021-09-04 RX ADMIN — Medication 40 MILLIGRAM(S): at 05:40

## 2021-09-04 RX ADMIN — Medication 650 MILLIGRAM(S): at 05:43

## 2021-09-04 RX ADMIN — Medication 25 MILLIGRAM(S): at 05:40

## 2021-09-04 RX ADMIN — METFORMIN HYDROCHLORIDE 1000 MILLIGRAM(S): 850 TABLET ORAL at 05:40

## 2021-09-04 RX ADMIN — Medication 500 MILLIGRAM(S): at 08:12

## 2021-09-04 NOTE — DISCHARGE NOTE NURSING/CASE MANAGEMENT/SOCIAL WORK - NSDCPEFALRISK_GEN_ALL_CORE
For information on Fall & injury Prevention, visit [email or web address]

## 2021-09-04 NOTE — DISCHARGE NOTE PROVIDER - NSDCACTIVITY_GEN_ALL_CORE
Do not drive or operate machinery/Showering allowed/Do not make important decisions/Stairs allowed/Walking - Indoors allowed/No heavy lifting/straining/Walking - Outdoors allowed/Follow Instructions Provided by your Surgical Team

## 2021-09-04 NOTE — DISCHARGE NOTE PROVIDER - CARE PROVIDERS DIRECT ADDRESSES
[email or web address]

## 2021-09-04 NOTE — DISCHARGE NOTE PROVIDER - NSDCMRMEDTOKEN_GEN_ALL_CORE_FT
aspirin 81 mg oral tablet: orally once a day  glimepiride 4 mg oral tablet: 1 tab(s) orally once a day  lisinopril 40 mg oral tablet: 1 tab(s) orally once a day  metFORMIN 500 mg oral tablet, extended release: 2 tab(s) orally 2 times a day. RESTART ON AUGUST 5, 2021.  Metoprolol Succinate ER 25 mg oral tablet, extended release: 1 tab(s) orally once a day   rosuvastatin 20 mg oral tablet: 1 tab(s) orally once a day   ascorbic acid 500 mg oral tablet: 1 tab(s) orally once a day  aspirin 325 mg oral tablet: 1 tab(s) orally once a day  ferrous sulfate 325 mg (65 mg elemental iron) oral tablet: 1 tab(s) orally once a day  folic acid 1 mg oral tablet: 1 tab(s) orally once a day  furosemide 40 mg oral tablet: 1 tab(s) orally once a day   Jardiance 10 mg oral tablet: 1 tab(s) orally once a day   metFORMIN 1000 mg oral tablet: 1 tab(s) orally 2 times a day  metoprolol tartrate 25 mg oral tablet: 1 tab(s) orally 2 times a day  potassium chloride 20 mEq oral tablet, extended release: 1 tab(s) orally once a day. Take with Furosemide.  rosuvastatin 20 mg oral tablet: 1 tab(s) orally once a day

## 2021-09-04 NOTE — DISCHARGE NOTE PROVIDER - CARE PROVIDER_API CALL
Tomy Polanco)  Surgery; Thoracic and Cardiac Surgery  [address] [place] [address]  Phone: [phone]  Fax: [phone]  Follow Up Time:     Sharla Jules (NP; RN)  NP in [facility]  [address], [address]  [place] [address]  Phone: [phone]  Fax: [phone]  Follow Up Time:     Sharla Puente (DO)  Internal Medicine  [address], [address]  [place] [address]  Phone: [phone]  Fax: [phone]  Follow Up Time:    Tomy Polanco)  Surgery; Thoracic and Cardiac Surgery  [address] [place] [address]  Phone: [phone]  Fax: [phone]  Follow Up Time:     Sharla Jules (NP; RN)  NP in [facility]  [address], [address]  [place] [address]  Phone: [phone]  Fax: [phone]  Follow Up Time:     Sharla Puente (DO)  Internal Medicine  [address], [address]  [place] [address]  Phone: [phone]  Fax: [phone]  Follow Up Time:     Jose Elizabeth)  EndocrinologyMetabDiabetes; Internal Medicine  [address] [place] [address]  Phone: [phone]  Fax: [phone]  Follow Up Time:

## 2021-09-04 NOTE — DISCHARGE NOTE PROVIDER - NSDCFUADDAPPT_GEN_ALL_CORE_FT
Please follow up with Dr. Polanco by calling the CT Surgery office at [phone] on the next open business day to make an appointment.    Your Care Navigator Nurse Practitioner will be in touch to see you in your home within a few days from discharge. The Follow Your Heart program can help ensure you understand your medications, discharge instructions and answer any questions you may have at that time. They are also a great source to address concerns during the day and may be reached at [phone].    Please follow up with your Cardiologist and Primary Care Physician 2-4 weeks from discharge.

## 2021-09-04 NOTE — DISCHARGE NOTE NURSING/CASE MANAGEMENT/SOCIAL WORK - PATIENT PORTAL LINK FT
You can access the FollowMyHealth Patient Portal offered by [facility] by registering at the following website: [email or web address]. By joining [facility]’s FollowMyHealth portal, you will also be able to view your health information using other applications (apps) compatible with our system.

## 2021-09-04 NOTE — DISCHARGE NOTE NURSING/CASE MANAGEMENT/SOCIAL WORK - NSDCFUADDAPPT_GEN_ALL_CORE_FT
Please follow up with Dr. Polanco by calling the CT Surgery office at [phone] on the next open business day to make an appointment.    Your Care Navigator Nurse Practitioner will be in touch to see you in your home within a few days from discharge. The Follow Your Heart program can help ensure you understand your medications, discharge instructions and answer any questions you may have at that time. They are also a great source to address concerns during the day and may be reached at [phone].    Please follow up with your Cardiologist and Primary Care Physician 2-4 weeks from discharge.

## 2021-09-04 NOTE — DISCHARGE NOTE PROVIDER - NSDCCPCAREPLAN_GEN_ALL_CORE_FT
PRINCIPAL DISCHARGE DIAGNOSIS  Diagnosis: CAD (coronary artery disease)  Assessment and Plan of Treatment: 1. Take ALL of your medications as ordered. Fill your prescriptions the day you are discharged and take according to the schedule you were given. Continue to take a stool softener if you are taking narcotic pain medications. AVOID medications such as ibuprofen or naproxen if you have had bypass surgery. If you have any questions or are unable to fill the prescriptions, please call the office right away at [phone].  2. Shower daily. Clean all incisions daily while showering with warm water and mild soap, pat dry with a clean towel and do not cover with any dressings unless instructed to. No bathing, swimming in a pool or the ocean until instructed by MD.  DO NOT use creams or lotions on the wound.  3. We advise that you do not drive until instructed by MD.   4. You may not return to work until instructed by MD.   5. Please eat a low fat, low cholesterol, low salt diet. (No added/extra salt)  6. Weigh yourself every day in the morning and record it in the weight log in your red folder. Notify the office of any weight gain more than 2-3 pounds in 24 hours.  7. Continue breathing exercises several times a day. Continue to use your heart pillow.  8. No heavy lifting nothing greater than 5 pounds until cleared by MD.   9. Call / Notify MD any fever greater than 101.0, any drainage from incisions or if they become red, hot or very tender to the touch.  10. Increase activity as tolerated. Walk indoors and/or outdoors at least 3 times a day.      SECONDARY DISCHARGE DIAGNOSES  Diagnosis: ABLA (acute blood loss anemia)  Assessment and Plan of Treatment: By taking iron (ferrous sulfate), folate and vitamin C as directed for one month, your red blood cell count should improve. Please take a stool softener with the medications as they may cause constipation.    Diagnosis: Diabetes  Assessment and Plan of Treatment: It is extremely important to follow a diabetic (consistent carbohydrates, LOW/NO ADDED SUGAR) diet and monitor your glucose (sugar) levels. You have an increased risk of complications, including wound infections, due to the diabetes.  1. Check your glucoses 3-4 times daily before meals and bedtime.   2. Keep a log of your glucose levels every day.   3. Make an appointment to meet with your primary care provider or endocrinologist within a week.   4. Take ALL of your medications as prescribed. Only hold medications for low glucose levels (< 80) or if you are symptomatic (dizzy, sweating, lightheaded).  5. Ideally, we would like all of the glucose levels to be between .   You may have been discharged on different medications than you were taking before. Your body reacts differently to the medications after surgery. Please continue to take the medications as prescribed and notify the office, nurse practitioner or your PCP if you have any concerns right away.

## 2021-09-04 NOTE — DISCHARGE NOTE PROVIDER - HOSPITAL COURSE
65 year old male patient with a medical history of CAD, Type 2 DM (HA1C 6.3 on PO medications) HTN, HLD, Hiatal Hernia, GERD, carotid stenosis (RCA >70%), cataracts, now s/p CABG X 4 (LIMA to LAD, SVG to PDA, SVG to OM1 "y"ed to OM3) with b/l SVG harvests on 8/30/21 with Dr. Polanco. Postoperative course significant for ABLA (s/p 1u PRBC and 1u plts postop). Patient remains hemodynamically stable at this time. Plan to discharge home later today as per Dr. Proctor (covering physician for Dr. Polanco). 65 year old male patient with a medical history of CAD, Type 2 DM (HA1C 6.3 on PO medications) HTN, HLD, Hiatal Hernia, GERD, carotid stenosis (RCA >70%), cataracts, now s/p CABG X 4 (LIMA to LAD, SVG to PDA, SVG to OM1 "y"ed to OM3) with b/l SVG harvests on 8/30/21 with Dr. Polanco. Postoperative course significant for ABLA (s/p 1u PRBC and 1u plts postop). Patient remains hemodynamically stable at this time. Plan to discharge home later today as per Dr. Proctor (covering physician for Dr. Polanco).     Constitutional: NAD, well appearing  Neuro: A+O x 3, non-focal, speech clear and intact  HEENT: NC/AT, PERRL, EOMI, anicteric sclerae, oral mucosa pink and moist  Neck: supple, no JVD  CV: regular rate, regular rhythm, +S1S2, no murmurs or rub  Pulm/chest: lung sounds CTA and equal bilaterally, no accessory muscle use noted; MSI wound well healing, C/D/I no evidence of infection or wound dehissance.   Abd: soft, NT, ND, +BS  Ext: HALL x 4, no C/C/E, SVG harvest sites c/d/i, no evidence of infection  Skin: warm, well perfused  Psych: calm, appropriate affect

## 2021-09-04 NOTE — DISCHARGE NOTE PROVIDER - NSDCFUADDINST_GEN_ALL_CORE_FT
Please call the Cardiothoracic Surgery office at [phone] if you are experiencing any shortness of breath, chest pain, fevers or chills, drainage from the incisions, persistent nausea, vomiting or if you have any questions about your medications. If the symptoms are severe, call 911 and go to the nearest hospital. You can also call [phone] for an emergency [facility] ambulance, which will take you to the closest [facility].    If you need any assistance for making any appointments for a new consult or referral in any specialty, please call our [facility] Clinical Coordination Center at [phone].

## 2021-09-04 NOTE — DISCHARGE NOTE PROVIDER - PROVIDER TOKENS
PROVIDER:[TOKEN:[[id number]]],PROVIDER:[TOKEN:[[id number]]],PROVIDER:[TOKEN:[[id number]]] PROVIDER:[TOKEN:[[id number]]],PROVIDER:[TOKEN:[[id number]]],PROVIDER:[TOKEN:[[id number]]],PROVIDER:[TOKEN:[[id number]]]

## 2021-09-05 ENCOUNTER — TRANSCRIPTION ENCOUNTER (OUTPATIENT)
Age: 66
End: 2021-09-05

## 2021-09-07 ENCOUNTER — NON-APPOINTMENT (OUTPATIENT)
Age: 66
End: 2021-09-07

## 2021-09-07 PROBLEM — I25.10 ATHEROSCLEROTIC HEART DISEASE OF NATIVE CORONARY ARTERY WITHOUT ANGINA PECTORIS: Chronic | Status: ACTIVE | Noted: 2021-08-18

## 2021-09-07 PROBLEM — Z98.890 OTHER SPECIFIED POSTPROCEDURAL STATES: Chronic | Status: ACTIVE | Noted: 2021-08-18

## 2021-09-07 RX ORDER — SAW PALMETTO 160 MG
500 CAPSULE ORAL DAILY
Refills: 0 | Status: ACTIVE | COMMUNITY
Start: 2021-09-07

## 2021-09-07 RX ORDER — ASPIRIN 81 MG
81 TABLET, DELAYED RELEASE (ENTERIC COATED) ORAL
Refills: 0 | Status: DISCONTINUED | COMMUNITY
End: 2021-09-07

## 2021-09-07 RX ORDER — LISINOPRIL 40 MG/1
40 TABLET ORAL
Qty: 90 | Refills: 3 | Status: DISCONTINUED | COMMUNITY
Start: 2019-04-02 | End: 2021-09-07

## 2021-09-09 ENCOUNTER — APPOINTMENT (OUTPATIENT)
Dept: CARE COORDINATION | Facility: HOME HEALTH | Age: 66
End: 2021-09-09
Payer: MEDICARE

## 2021-09-09 VITALS
TEMPERATURE: 98.1 F | DIASTOLIC BLOOD PRESSURE: 70 MMHG | RESPIRATION RATE: 18 BRPM | BODY MASS INDEX: 28.97 KG/M2 | OXYGEN SATURATION: 98 % | SYSTOLIC BLOOD PRESSURE: 108 MMHG | WEIGHT: 199 LBS | HEART RATE: 78 BPM

## 2021-09-09 PROCEDURE — 99024 POSTOP FOLLOW-UP VISIT: CPT

## 2021-09-09 RX ORDER — FUROSEMIDE 40 MG/1
40 TABLET ORAL
Qty: 14 | Refills: 0 | Status: COMPLETED | COMMUNITY
Start: 2021-09-05 | End: 2021-09-11

## 2021-09-09 RX ORDER — POTASSIUM CHLORIDE 1500 MG/1
20 TABLET, FILM COATED, EXTENDED RELEASE ORAL
Refills: 0 | Status: COMPLETED | COMMUNITY
Start: 2021-09-05 | End: 2021-09-11

## 2021-09-09 NOTE — PHYSICAL EXAM
[Sclera] : the sclera and conjunctiva were normal [PERRL With Normal Accommodation] : pupils were equal in size, round, and reactive to light [Extraocular Movements] : extraocular movements were intact [Neck Appearance] : the appearance of the neck was normal [Jugular Venous Distention Increased] : there was no jugular-venous distention [] : no respiratory distress [Respiration, Rhythm And Depth] : normal respiratory rhythm and effort [Exaggerated Use Of Accessory Muscles For Inspiration] : no accessory muscle use [Auscultation Breath Sounds / Voice Sounds] : lungs were clear to auscultation bilaterally [Heart Rate And Rhythm] : heart rate was normal and rhythm regular [Apical Impulse] : the apical impulse was normal [Heart Sounds] : normal S1 and S2 [Examination Of The Chest] : the chest was normal in appearance [Chest Visual Inspection Thoracic Asymmetry] : no chest asymmetry [Diminished Respiratory Excursion] : normal chest expansion [2+] : left 2+ [Bowel Sounds] : normal bowel sounds [Abdomen Soft] : soft [Abdomen Tenderness] : non-tender [Abnormal Walk] : normal gait [Nail Clubbing] : no clubbing  or cyanosis of the fingernails [Motor Tone] : muscle strength and tone were normal [Skin Turgor] : normal skin turgor [Skin Color & Pigmentation] : normal skin color and pigmentation [No Focal Deficits] : no focal deficits [Oriented To Time, Place, And Person] : oriented to person, place, and time [Impaired Insight] : insight and judgment were intact [Affect] : the affect was normal [Mood] : the mood was normal [Fingers] :  capillary refill of the fingers was normal [FreeTextEntry1] : Bial SVG sites healing well. No erythema, warmth or drainage. Resolving ecchymosis to bilat. thighs.

## 2021-09-09 NOTE — REVIEW OF SYSTEMS
[As Noted in HPI] : as noted in HPI [Negative] : Heme/Lymph [Nosebleeds] : no nosebleeds [Nasal Discharge] : no nasal discharge [Sore Throat] : no sore throat [Limb Pain] : no limb pain [Sleep Disturbances] : no sleep disturbances [Anxiety] : no anxiety [Depression] : no depression

## 2021-09-09 NOTE — ASSESSMENT
[FreeTextEntry1] : Overweight, older male doing well s/p CABG x 4.\par \par Problems:\par 1. HTN/HLD/CAD: now s/p CABG x 4 with post-op anemia & trace left pleural effusion.\par c/w daily weights, temps and showers.\par Continue with current meds: ASA, Metoprolol, Crestor, Lasix, Crestor, vitamin C, ferrous sulfate,& folic acid.\par May d/c Lasix & KCL after 2 more doses as edema is minimal, weight is down & lungs are CTA.\par c/w Tylenol PRN pain.\par May try Motrin 400 mg po q 6 hours PRN pain\par Educated on all medication indications & side effects.\par Keep legs elevated.\par Ambulate as tolerated.\par Incentive spirometry.\par Coughing & deep breathing exercises.\par Homecare- [facility].\par Diet- low salt, low fat.\par Education provided on diet. \par Cardiac rehab recommended when cleared by cardiology.\par f/u appts - CTS, Cardiology & PCP.\par [facility] team will continue to f/u with pt status. \par Pt agrees to call with any questions, issues or concerns.\par \par 2. DM type II controlled: Hg A1c 6.3%\par c/w Metformin & Glimepiride as prescribed\par Educated on diabetic diet & goal Hg A1c of 6 % to prevent further vascular insult.\par f/u with PCP/Endocrinology\par \par \par

## 2021-09-09 NOTE — HISTORY OF PRESENT ILLNESS
[FreeTextEntry1] : As per EMR:\par \par Hospital Course: 65 year old male patient with a medical history of CAD, Type 2 DM (HA1C 6.3 on PO medications) HTN, HLD, Hiatal Hernia, GERD, carotid stenosis (RCA >70%), cataracts, now s/p CABG X 4 (LIMA to LAD, SVG to PDA, SVG to OM1 "y"ed to OM3) with b/l SVG harvests on 8/30/21 with Dr. Polanco. Postoperative course significant for ABLA (s/p 1u PRBC and 1u PLTs postop). Patient remains hemodynamically stable at this time. Plan to discharge home later today as per Dr. Proctor (covering physician for Dr. Polanco). \par \par Pt. discharged on 9/4/21.\par \par Labs & studies reviewed. 9/4 H & H 8.9/26.8. 9/4 CXR with trace left pleural effusion.\par \par Pt. is seen & examined in home today in routine follow-up. Pt. unable to afford Jardiance due to high cost so he resumed Glimepiride as previously ordered. \par \par Pt. reports that he is feeling well in general. He does have intermittent left sided lateral rib pain with overall his pain is well controlled. His weight is down to 199 lbs.He denies any cardiopulmonary complaints.

## 2021-09-10 ENCOUNTER — NON-APPOINTMENT (OUTPATIENT)
Age: 66
End: 2021-09-10

## 2021-09-12 ENCOUNTER — TRANSCRIPTION ENCOUNTER (OUTPATIENT)
Age: 66
End: 2021-09-12

## 2021-09-19 ENCOUNTER — TRANSCRIPTION ENCOUNTER (OUTPATIENT)
Age: 66
End: 2021-09-19

## 2021-09-28 ENCOUNTER — APPOINTMENT (OUTPATIENT)
Dept: CARDIOTHORACIC SURGERY | Facility: CLINIC | Age: 66
End: 2021-09-28
Payer: MEDICARE

## 2021-09-28 VITALS
TEMPERATURE: 98.2 F | DIASTOLIC BLOOD PRESSURE: 78 MMHG | OXYGEN SATURATION: 97 % | HEART RATE: 60 BPM | BODY MASS INDEX: 28.76 KG/M2 | HEIGHT: 70 IN | WEIGHT: 200.9 LBS | SYSTOLIC BLOOD PRESSURE: 138 MMHG | RESPIRATION RATE: 18 BRPM

## 2021-09-28 DIAGNOSIS — I65.29 OCCLUSION AND STENOSIS OF UNSPECIFIED CAROTID ARTERY: ICD-10-CM

## 2021-09-28 PROCEDURE — 99024 POSTOP FOLLOW-UP VISIT: CPT

## 2021-09-28 RX ORDER — ASPIRIN ENTERIC COATED TABLETS 81 MG 81 MG/1
81 TABLET, DELAYED RELEASE ORAL DAILY
Qty: 30 | Refills: 3 | Status: ACTIVE | COMMUNITY
Start: 2021-09-28 | End: 1900-01-01

## 2021-10-04 ENCOUNTER — TRANSCRIPTION ENCOUNTER (OUTPATIENT)
Age: 66
End: 2021-10-04

## 2021-10-12 ENCOUNTER — NON-APPOINTMENT (OUTPATIENT)
Age: 66
End: 2021-10-12

## 2021-10-12 ENCOUNTER — APPOINTMENT (OUTPATIENT)
Dept: CARDIOLOGY | Facility: CLINIC | Age: 66
End: 2021-10-12
Payer: MEDICARE

## 2021-10-12 VITALS
WEIGHT: 198 LBS | HEIGHT: 70 IN | DIASTOLIC BLOOD PRESSURE: 64 MMHG | OXYGEN SATURATION: 97 % | HEART RATE: 66 BPM | SYSTOLIC BLOOD PRESSURE: 110 MMHG | BODY MASS INDEX: 28.35 KG/M2

## 2021-10-12 PROCEDURE — 93000 ELECTROCARDIOGRAM COMPLETE: CPT

## 2021-10-12 PROCEDURE — 99214 OFFICE O/P EST MOD 30 MIN: CPT | Mod: 25

## 2021-10-12 RX ORDER — ASPIRIN 325 MG/1
325 TABLET ORAL
Refills: 0 | Status: DISCONTINUED | COMMUNITY
Start: 2021-09-07 | End: 2021-10-12

## 2021-10-12 RX ORDER — METOPROLOL TARTRATE 25 MG/1
25 TABLET, FILM COATED ORAL TWICE DAILY
Qty: 14 | Refills: 0 | Status: DISCONTINUED | COMMUNITY
Start: 2021-09-28 | End: 2021-10-12

## 2021-10-12 RX ORDER — METOPROLOL TARTRATE 25 MG/1
25 TABLET, FILM COATED ORAL
Refills: 0 | Status: DISCONTINUED | COMMUNITY
Start: 2021-09-07 | End: 2021-10-12

## 2021-10-13 ENCOUNTER — LABORATORY RESULT (OUTPATIENT)
Age: 66
End: 2021-10-13

## 2021-10-13 ENCOUNTER — APPOINTMENT (OUTPATIENT)
Dept: FAMILY MEDICINE | Facility: CLINIC | Age: 66
End: 2021-10-13
Payer: MEDICARE

## 2021-10-13 VITALS
SYSTOLIC BLOOD PRESSURE: 130 MMHG | RESPIRATION RATE: 15 BRPM | TEMPERATURE: 98 F | HEIGHT: 70 IN | WEIGHT: 200 LBS | BODY MASS INDEX: 28.63 KG/M2 | HEART RATE: 82 BPM | OXYGEN SATURATION: 97 % | DIASTOLIC BLOOD PRESSURE: 90 MMHG

## 2021-10-13 DIAGNOSIS — E56.9 VITAMIN DEFICIENCY, UNSPECIFIED: ICD-10-CM

## 2021-10-13 DIAGNOSIS — Z13.29 ENCOUNTER FOR SCREENING FOR OTHER SUSPECTED ENDOCRINE DISORDER: ICD-10-CM

## 2021-10-13 PROCEDURE — 99214 OFFICE O/P EST MOD 30 MIN: CPT | Mod: 25

## 2021-10-13 PROCEDURE — 36415 COLL VENOUS BLD VENIPUNCTURE: CPT

## 2021-10-13 NOTE — HISTORY OF PRESENT ILLNESS
[FreeTextEntry1] : Patient presents for 6 week follow up from bypass surgery also needs labs  [de-identified] : 66 yr old male is here for follow up. Reports he had quadruple bypass on  August 30 TH and is taking Plavix with increased dose of Crestor 10 to 20 mg daily and metoprolol without complications. He is no longer taking lisinopril. Denies changes in vision, dizziness, chest pain, palpitations and lower extremity edema. \par

## 2021-10-13 NOTE — PLAN
[FreeTextEntry1] : 66 yr old male follow up \par \par continue all medications as directed \par healthy diet and physical activity as tolerated\par I have reviewed prior cardio notes \par \par Routine lab work today to screen for anemia, CAD, liver and kidney abnormalities, and thyroid disorders (CBC, CMP, lipid, TSH, A1c, B12/folate) \par \par continue all medications as directed \par I have updated chart with new medications \par RTO as routine for follow up.\par

## 2021-10-13 NOTE — PHYSICAL EXAM
[No Acute Distress] : no acute distress [Normal Sclera/Conjunctiva] : normal sclera/conjunctiva [Normal Outer Ear/Nose] : the outer ears and nose were normal in appearance [No JVD] : no jugular venous distention [No Lymphadenopathy] : no lymphadenopathy [Supple] : supple [Thyroid Normal, No Nodules] : the thyroid was normal and there were no nodules present [No Respiratory Distress] : no respiratory distress  [No Accessory Muscle Use] : no accessory muscle use [Clear to Auscultation] : lungs were clear to auscultation bilaterally [Regular Rhythm] : with a regular rhythm [Normal S1, S2] : normal S1 and S2 [No Murmur] : no murmur heard [No Extremity Clubbing/Cyanosis] : no extremity clubbing/cyanosis [Normal Posterior Cervical Nodes] : no posterior cervical lymphadenopathy [Normal Anterior Cervical Nodes] : no anterior cervical lymphadenopathy [No Spinal Tenderness] : no spinal tenderness [Normal Gait] : normal gait [Normal Affect] : the affect was normal [Alert and Oriented x3] : oriented to person, place, and time [Normal Insight/Judgement] : insight and judgment were intact [Kyphosis] : no kyphosis [de-identified] : healed sternal surgical scar

## 2021-10-15 LAB
ALBUMIN SERPL ELPH-MCNC: 4.6 G/DL
ALP BLD-CCNC: 49 U/L
ALT SERPL-CCNC: 19 U/L
ANION GAP SERPL CALC-SCNC: 11 MMOL/L
AST SERPL-CCNC: 22 U/L
BASOPHILS # BLD AUTO: 0.09 K/UL
BASOPHILS NFR BLD AUTO: 1.3 %
BILIRUB SERPL-MCNC: 0.5 MG/DL
BUN SERPL-MCNC: 12 MG/DL
CALCIUM SERPL-MCNC: 9.8 MG/DL
CHLORIDE SERPL-SCNC: 103 MMOL/L
CHOLEST SERPL-MCNC: 112 MG/DL
CO2 SERPL-SCNC: 25 MMOL/L
CREAT SERPL-MCNC: 0.86 MG/DL
EOSINOPHIL # BLD AUTO: 0.48 K/UL
EOSINOPHIL NFR BLD AUTO: 7 %
ESTIMATED AVERAGE GLUCOSE: 108 MG/DL
GLUCOSE SERPL-MCNC: 83 MG/DL
HBA1C MFR BLD HPLC: 5.4 %
HCT VFR BLD CALC: 34.2 %
HDLC SERPL-MCNC: 32 MG/DL
HGB BLD-MCNC: 10.8 G/DL
IMM GRANULOCYTES NFR BLD AUTO: 0.3 %
LDLC SERPL CALC-MCNC: 53 MG/DL
LYMPHOCYTES # BLD AUTO: 1.77 K/UL
LYMPHOCYTES NFR BLD AUTO: 25.9 %
MAN DIFF?: NORMAL
MCHC RBC-ENTMCNC: 29.8 PG
MCHC RBC-ENTMCNC: 31.6 GM/DL
MCV RBC AUTO: 94.5 FL
MONOCYTES # BLD AUTO: 0.46 K/UL
MONOCYTES NFR BLD AUTO: 6.7 %
NEUTROPHILS # BLD AUTO: 4.01 K/UL
NEUTROPHILS NFR BLD AUTO: 58.8 %
NONHDLC SERPL-MCNC: 79 MG/DL
PLATELET # BLD AUTO: 266 K/UL
POTASSIUM SERPL-SCNC: 4.8 MMOL/L
PROT SERPL-MCNC: 7.2 G/DL
RBC # BLD: 3.62 M/UL
RBC # FLD: 13.3 %
SODIUM SERPL-SCNC: 138 MMOL/L
TRIGL SERPL-MCNC: 133 MG/DL
WBC # FLD AUTO: 6.83 K/UL

## 2021-11-15 ENCOUNTER — LABORATORY RESULT (OUTPATIENT)
Age: 66
End: 2021-11-15

## 2021-11-16 ENCOUNTER — TRANSCRIPTION ENCOUNTER (OUTPATIENT)
Age: 66
End: 2021-11-16

## 2021-11-22 ENCOUNTER — NON-APPOINTMENT (OUTPATIENT)
Age: 66
End: 2021-11-22

## 2021-11-22 NOTE — HISTORY OF PRESENT ILLNESS
[FreeTextEntry1] : Pt is a 65 y/o M who had abnormal nuclear stress test. Cardiac cath 08/2021 mLAD 80%, pCx 70%, OM3 100%, pRCA 100%.  He is now s/p 4V CABG with Dr Polanco 08/30/2021 (LIMA- LAD, SVG-PDA, SVG-OM1 to OM3).\par EF 54%\par Today he states that he is feeling well and has no cardiac complaints - denies CP, SOB, diaphoresis, palpitations, dizziness, syncope, LE edema, PND, orthopnea. \par COVID vaccine 04/2021 Pfizer\par \par PMH: HTN, HLD, DM, GERD mostly diet controlled, mild hiatal hernia\par Family hx: father DM/"heart issues", mother CVA 90\par Smoking status: never\par social ETOH\par no drug use\par Current exercise: none\par Daily water intake: 2 glasses\par Daily caffeine intake: 2 cups coffee\par OTC medications: aleve/tylenol PRN\par NKDA\par Previous cardiac testing: echo many yrs ago \par Previous hospitalizations: none\par

## 2021-11-22 NOTE — DISCUSSION/SUMMARY
[FreeTextEntry1] : Pt is a 65 y/o M who had abnormal nuclear stress test. Cardiac cath 08/2021 mLAD 80%, pCx 70%, OM3 100%, pRCA 100%.  He is now s/p 4V CABG with Dr Polanco 08/30/2021 (LIMA- LAD, SVG-PDA, SVG-OM1 to OM3).\par EF 54%\par \par CAD:\par c/w ASA and plavix\par c/w statin, goal LDL <70\par c/w BB\par currently asymptomatic\par \par HTN:\par well controlled\par c/w BB\par Advised low salt diet, regular exercise, weight loss \par \par HLD:\par c/w statin\par goal LDL <70\par Advised lifestyle modifications \par \par DM:\par follows with PCP\par c/w current meds\par goal A1c <7\par Advised lifestyle modifications \par \par Pt will return in 3-4 mnths or sooner as needed but I encouraged communication via phone or portal if necessary. \par The described plan was discussed with the pt.  All questions and concerns were addressed to the best of my knowledge.

## 2022-01-05 ENCOUNTER — NON-APPOINTMENT (OUTPATIENT)
Age: 67
End: 2022-01-05

## 2022-01-05 ENCOUNTER — APPOINTMENT (OUTPATIENT)
Dept: CARDIOLOGY | Facility: CLINIC | Age: 67
End: 2022-01-05
Payer: MEDICARE

## 2022-01-05 VITALS
HEIGHT: 70 IN | DIASTOLIC BLOOD PRESSURE: 72 MMHG | OXYGEN SATURATION: 97 % | SYSTOLIC BLOOD PRESSURE: 110 MMHG | HEART RATE: 71 BPM | BODY MASS INDEX: 29.35 KG/M2 | WEIGHT: 205 LBS

## 2022-01-05 DIAGNOSIS — Z95.1 PRESENCE OF AORTOCORONARY BYPASS GRAFT: ICD-10-CM

## 2022-01-05 PROCEDURE — 93000 ELECTROCARDIOGRAM COMPLETE: CPT

## 2022-01-05 PROCEDURE — 99214 OFFICE O/P EST MOD 30 MIN: CPT | Mod: 25

## 2022-01-05 RX ORDER — CLOPIDOGREL BISULFATE 75 MG/1
75 TABLET, FILM COATED ORAL DAILY
Qty: 90 | Refills: 3 | Status: ACTIVE | COMMUNITY
Start: 2021-09-28 | End: 1900-01-01

## 2022-01-05 NOTE — DISCUSSION/SUMMARY
[FreeTextEntry1] : Pt is a 67 y/o M who had abnormal nuclear stress test. Cardiac cath 08/2021 mLAD 80%, pCx 70%, OM3 100%, pRCA 100%.  He is now s/p 4V CABG with Dr Polanco 08/30/2021 (LIMA- LAD, SVG-PDA, SVG-OM1 to OM3).\par EF 54%\par \par CAD:\par c/w ASA and plavix\par c/w statin, goal LDL <70\par c/w BB\par currently asymptomatic\par \par HTN:\par well controlled\par c/w BB\par Advised low salt diet, regular exercise, weight loss \par \par HLD:\par c/w statin\par goal LDL <70\par Advised lifestyle modifications \par \par DM:\par follows with PCP\par c/w current meds\par goal A1c <7\par Advised lifestyle modifications \par \par Pt will return in 6 mnths or sooner as needed but I encouraged communication via phone or portal if necessary. \par The described plan was discussed with the pt.  All questions and concerns were addressed to the best of my knowledge.

## 2022-01-05 NOTE — HISTORY OF PRESENT ILLNESS
[FreeTextEntry1] : Pt is a 67 y/o M who had abnormal nuclear stress test. Cardiac cath 08/2021 mLAD 80%, pCx 70%, OM3 100%, pRCA 100%.  He is now s/p 4V CABG with Dr Polanco 08/30/2021 (LIMA- LAD, SVG-PDA, SVG-OM1 to OM3).\par EF 54%\par Today he states that he is feeling well and has no cardiac complaints - denies CP, SOB, diaphoresis, palpitations, dizziness, syncope, LE edema, PND, orthopnea. \par He is moving down to [place] in 2 weeks\par COVID vaccine 04/2021 Pfizer\par \par PMH: HTN, HLD, DM, GERD mostly diet controlled, mild hiatal hernia\par Family hx: father DM/"heart issues", mother CVA 90\par Smoking status: never\par social ETOH\par no drug use\par Current exercise: none\par Daily water intake: 2 glasses\par Daily caffeine intake: 2 cups coffee\par OTC medications: aleve/tylenol PRN\par NKDA\par Previous cardiac testing: echo many yrs ago \par Previous hospitalizations: none\par

## 2022-03-07 NOTE — PROGRESS NOTE ADULT - PROBLEM SELECTOR PROBLEM 4
Diabetes
2019 novel coronavirus disease (COVID-19)
Diabetes
2019 novel coronavirus disease (COVID-19)
Zygomaticofacial Flap Text: Given the location of the defect, shape of the defect and the proximity to free margins a zygomaticofacial flap was deemed most appropriate for repair.  Using a sterile surgical marker, the appropriate flap was drawn incorporating the defect and placing the expected incisions within the relaxed skin tension lines where possible. The area thus outlined was incised deep to adipose tissue with a #15 scalpel blade with preservation of a vascular pedicle.  The skin margins were undermined to an appropriate distance in all directions utilizing iris scissors.  The flap was then placed into the defect and anchored with interrupted buried subcutaneous sutures.

## 2022-04-03 ENCOUNTER — RX RENEWAL (OUTPATIENT)
Age: 67
End: 2022-04-03

## 2022-04-03 RX ORDER — GLIMEPIRIDE 4 MG/1
4 TABLET ORAL
Qty: 90 | Refills: 3 | Status: ACTIVE | COMMUNITY
Start: 2018-05-06 | End: 1900-01-01

## 2022-05-03 ENCOUNTER — LABORATORY RESULT (OUTPATIENT)
Age: 67
End: 2022-05-03

## 2022-05-03 ENCOUNTER — APPOINTMENT (OUTPATIENT)
Dept: FAMILY MEDICINE | Facility: CLINIC | Age: 67
End: 2022-05-03
Payer: MEDICARE

## 2022-05-03 VITALS
SYSTOLIC BLOOD PRESSURE: 140 MMHG | HEIGHT: 70 IN | DIASTOLIC BLOOD PRESSURE: 76 MMHG | HEART RATE: 82 BPM | RESPIRATION RATE: 14 BRPM | OXYGEN SATURATION: 96 %

## 2022-05-03 VITALS — TEMPERATURE: 97.3 F

## 2022-05-03 DIAGNOSIS — I25.10 ATHEROSCLEROTIC HEART DISEASE OF NATIVE CORONARY ARTERY W/OUT ANGINA PECTORIS: ICD-10-CM

## 2022-05-03 DIAGNOSIS — Z23 ENCOUNTER FOR IMMUNIZATION: ICD-10-CM

## 2022-05-03 DIAGNOSIS — I10 ESSENTIAL (PRIMARY) HYPERTENSION: ICD-10-CM

## 2022-05-03 DIAGNOSIS — D64.9 ANEMIA, UNSPECIFIED: ICD-10-CM

## 2022-05-03 DIAGNOSIS — E11.9 TYPE 2 DIABETES MELLITUS W/OUT COMPLICATIONS: ICD-10-CM

## 2022-05-03 DIAGNOSIS — E78.5 HYPERLIPIDEMIA, UNSPECIFIED: ICD-10-CM

## 2022-05-03 PROCEDURE — 36415 COLL VENOUS BLD VENIPUNCTURE: CPT

## 2022-05-03 PROCEDURE — 99215 OFFICE O/P EST HI 40 MIN: CPT | Mod: 25

## 2022-05-03 RX ORDER — CHLORHEXIDINE GLUCONATE 4 %
325 (65 FE) LIQUID (ML) TOPICAL DAILY
Refills: 0 | Status: DISCONTINUED | COMMUNITY
Start: 2021-09-07 | End: 2022-05-03

## 2022-05-03 RX ORDER — METFORMIN ER 500 MG 500 MG/1
500 TABLET ORAL
Qty: 360 | Refills: 1 | Status: ACTIVE | COMMUNITY
Start: 2018-05-03 | End: 1900-01-01

## 2022-05-03 RX ORDER — FOLIC ACID 1 MG/1
1 TABLET ORAL DAILY
Refills: 0 | Status: DISCONTINUED | COMMUNITY
Start: 2021-09-07 | End: 2022-05-03

## 2022-05-03 RX ORDER — CHOLECALCIFEROL (VITAMIN D3) 50 MCG
50 MCG TABLET ORAL
Refills: 0 | Status: ACTIVE | COMMUNITY
Start: 2022-05-03

## 2022-05-03 RX ORDER — ERGOCALCIFEROL 1.25 MG/1
1.25 MG CAPSULE, LIQUID FILLED ORAL
Qty: 12 | Refills: 0 | Status: DISCONTINUED | COMMUNITY
Start: 2021-10-15 | End: 2022-05-03

## 2022-05-03 NOTE — PHYSICAL EXAM
[No Acute Distress] : no acute distress [Normal Sclera/Conjunctiva] : normal sclera/conjunctiva [Normal Outer Ear/Nose] : the outer ears and nose were normal in appearance [No JVD] : no jugular venous distention [No Lymphadenopathy] : no lymphadenopathy [Supple] : supple [No Respiratory Distress] : no respiratory distress  [No Accessory Muscle Use] : no accessory muscle use [Clear to Auscultation] : lungs were clear to auscultation bilaterally [Normal Rate] : normal rate  [Normal S1, S2] : normal S1 and S2 [No Murmur] : no murmur heard [No Extremity Clubbing/Cyanosis] : no extremity clubbing/cyanosis [Normal Gait] : normal gait [Normal Affect] : the affect was normal

## 2022-05-03 NOTE — HISTORY OF PRESENT ILLNESS
[FreeTextEntry1] : Pt feels well, following up for medication renewals prior to move to [place]. [de-identified] : 66 yr old male is here for follow up. He was seen by cardio recently and has no issues at this time. Denies melena, hematuria.

## 2022-05-03 NOTE — PLAN
[FreeTextEntry1] : 66 yr old male follow up. \par \par Anemia: \par will repeat CBC along with iron studies \par given FOBT script \par prior colonoscopy normal \par denies acute blood loss\par \par continue all medications as directed \par healthy diet and physical activity as tolerated\par f/u with cardio as directed \par RTO as routine for follow up.\par

## 2022-05-04 ENCOUNTER — TRANSCRIPTION ENCOUNTER (OUTPATIENT)
Age: 67
End: 2022-05-04

## 2022-05-04 DIAGNOSIS — K64.8 OTHER HEMORRHOIDS: ICD-10-CM

## 2022-05-04 LAB
ALBUMIN SERPL ELPH-MCNC: 5 G/DL
ALP BLD-CCNC: 44 U/L
ALT SERPL-CCNC: 24 U/L
ANION GAP SERPL CALC-SCNC: 13 MMOL/L
AST SERPL-CCNC: 27 U/L
BASOPHILS # BLD AUTO: 0.1 K/UL
BASOPHILS NFR BLD AUTO: 1.7 %
BILIRUB SERPL-MCNC: 0.7 MG/DL
BUN SERPL-MCNC: 12 MG/DL
CALCIUM SERPL-MCNC: 10.1 MG/DL
CHLORIDE SERPL-SCNC: 103 MMOL/L
CHOLEST SERPL-MCNC: 152 MG/DL
CO2 SERPL-SCNC: 22 MMOL/L
CREAT SERPL-MCNC: 0.99 MG/DL
EGFR: 84 ML/MIN/1.73M2
EOSINOPHIL # BLD AUTO: 0.41 K/UL
EOSINOPHIL NFR BLD AUTO: 7.1 %
ESTIMATED AVERAGE GLUCOSE: 137 MG/DL
FERRITIN SERPL-MCNC: 57 NG/ML
GLUCOSE SERPL-MCNC: 138 MG/DL
HBA1C MFR BLD HPLC: 6.4 %
HCT VFR BLD CALC: 40.8 %
HDLC SERPL-MCNC: 42 MG/DL
HGB BLD-MCNC: 13.3 G/DL
IMM GRANULOCYTES NFR BLD AUTO: 0.3 %
IRON SATN MFR SERPL: 26 %
IRON SERPL-MCNC: 107 UG/DL
LDLC SERPL CALC-MCNC: 77 MG/DL
LYMPHOCYTES # BLD AUTO: 1.53 K/UL
LYMPHOCYTES NFR BLD AUTO: 26.3 %
MAN DIFF?: NORMAL
MCHC RBC-ENTMCNC: 30.3 PG
MCHC RBC-ENTMCNC: 32.6 GM/DL
MCV RBC AUTO: 92.9 FL
MONOCYTES # BLD AUTO: 0.35 K/UL
MONOCYTES NFR BLD AUTO: 6 %
NEUTROPHILS # BLD AUTO: 3.4 K/UL
NEUTROPHILS NFR BLD AUTO: 58.6 %
NONHDLC SERPL-MCNC: 110 MG/DL
PLATELET # BLD AUTO: 277 K/UL
POTASSIUM SERPL-SCNC: 4.8 MMOL/L
PROT SERPL-MCNC: 7.7 G/DL
PSA FREE FLD-MCNC: 57 %
PSA FREE SERPL-MCNC: 0.19 NG/ML
PSA SERPL-MCNC: 0.34 NG/ML
RBC # BLD: 4.39 M/UL
RBC # BLD: 4.39 M/UL
RBC # FLD: 12.4 %
RETICS # AUTO: 1.9 %
RETICS AGGREG/RBC NFR: 84.7 K/UL
SODIUM SERPL-SCNC: 138 MMOL/L
T3FREE SERPL-MCNC: 2.69 PG/ML
T4 FREE SERPL-MCNC: 1 NG/DL
TIBC SERPL-MCNC: 406 UG/DL
TRANSFERRIN SERPL-MCNC: 345 MG/DL
TRIGL SERPL-MCNC: 166 MG/DL
TSH SERPL-ACNC: 1.8 UIU/ML
UIBC SERPL-MCNC: 299 UG/DL
WBC # FLD AUTO: 5.81 K/UL

## 2022-05-06 ENCOUNTER — TRANSCRIPTION ENCOUNTER (OUTPATIENT)
Age: 67
End: 2022-05-06

## 2022-06-30 ENCOUNTER — RX RENEWAL (OUTPATIENT)
Age: 67
End: 2022-06-30

## 2022-07-25 ENCOUNTER — RX RENEWAL (OUTPATIENT)
Age: 67
End: 2022-07-25

## 2022-07-25 RX ORDER — ROSUVASTATIN CALCIUM 20 MG/1
20 TABLET, FILM COATED ORAL DAILY
Qty: 90 | Refills: 0 | Status: ACTIVE | COMMUNITY
Start: 2018-05-16

## 2022-09-26 ENCOUNTER — RX RENEWAL (OUTPATIENT)
Age: 67
End: 2022-09-26

## 2022-09-27 RX ORDER — METOPROLOL SUCCINATE 25 MG/1
25 TABLET, EXTENDED RELEASE ORAL DAILY
Qty: 90 | Refills: 1 | Status: ACTIVE | COMMUNITY
Start: 2021-10-12 | End: 1900-01-01

## 2023-07-12 ENCOUNTER — RX RENEWAL (OUTPATIENT)
Age: 68
End: 2023-07-12

## 2023-08-25 NOTE — H&P PST ADULT - ASSESSMENT
faxed   Assessment:     ASA:   Mall:   ABR:   COVID: Vaccinated    Problem List:   1. Stable angina with high risk nuclear stress test.  ·	LHC and possible intervention. Consent obtained.  ·	Will start DAPT if PCI performed.  ·	IV: NS KVO  ·	Aspirin if not taken today.    2.     Discharge Planning:   ·	Discharge today if no PCI performed.  ·	Discharge in AM if PCI performed. Assessment: 64y/o male never smoker with history of DM, HTN, HLD, and GERD who was referred to Dr. Puente for an abnormal EKG (NSR with PVC's). He c/o FUENTES (CCS class 2 anginal equivalent) and epigastric pain. He denies palpitations, orthopnea, PND, edema or syncope/near syncope. He had a nuclear stress test during which he exercised for 6:23 and developed 1.5-2 mm horizontal ST depressions in inferolateral leads with inferior T wave inversions. 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    ASA: 2  Mall: 2  ABR:   COVID: Vaccinated    Problem List:   1. Stable angina with high risk nuclear stress test.  ·	LHC and possible intervention. Consent obtained.  ·	Will start DAPT if PCI performed.  ·	IV: NS KVO  ·	Aspirin if not taken today.    2.     Discharge Planning:   ·	Discharge today if no PCI performed.  ·	Discharge in AM if PCI performed. Assessment: 64y/o male never smoker with history of DM, HTN, HLD, and GERD who was referred to Dr. Puente for an abnormal EKG (NSR with PVC's). He c/o FUENTES (CCS class 2 anginal equivalent) and epigastric pain. He denies palpitations, orthopnea, PND, edema or syncope/near syncope. He had a nuclear stress test during which he exercised for 6:23 and developed 1.5-2 mm horizontal ST depressions in inferolateral leads with inferior T wave inversions. 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    ASA: 2  Mall: 2  ABR: 1.0%  COVID: Vaccinated    Problem List:   1. Stable angina with high risk nuclear stress test.  ·	LHC and possible intervention. Consent obtained.  ·	Will start DAPT if PCI performed.  ·	IV: NS KVO  ·	Aspirin if not taken today.      Discharge Planning:   ·	Discharge today if no PCI performed.  ·	Discharge in AM if PCI performed.

## 2023-11-09 NOTE — PROGRESS NOTE ADULT - SUBJECTIVE AND OBJECTIVE BOX
Department of Cardiology                                                                  [facility]/[place] [address] [address]                                                            Telephone: [phone]. Fax:[phone]                                                                           Cardiac Catheterization Note       Subjective:  65y  Male who had a left heart catheterization which showed (official report pending):       LM: No significant CAD       LAD: 90% mid stenosis and a 70% stenosis       LCX: Proximal 80% stenosis and occluded OM3.       RCA: Occluded proximally.         Access: Right radial artery       Hemostasis: Radial band       Total Contrast: 71 mL Omnipaque    PAST MEDICAL & SURGICAL HISTORY:  Mixed hyperlipidemia  Essential hypertension  Type 2 diabetes mellitus without complication, without long-term current use of insulin  Cataract  GERD (gastroesophageal reflux disease)  S/P cataract extraction and insertion of intraocular lens    FAMILY HISTORY:  Family history of cerebrovascular accident (CVA) (Mother)  Family history of diabetes mellitus (Father)  Family history of cardiac disorder (Father)    Home Medications:  aspirin 81 mg oral tablet: orally once a day (02 Aug 2021 07:51)  glimepiride 4 mg oral tablet: 1 tab(s) orally once a day (02 Aug 2021 07:51)  lisinopril 40 mg oral tablet: 1 tab(s) orally once a day (02 Aug 2021 07:51)  metFORMIN 500 mg oral tablet, extended release: 2 tab(s) orally 2 times a day (02 Aug 2021 07:51)  rosuvastatin 10 mg oral tablet: 1 tab(s) orally once a day (02 Aug 2021 07:51)    HPI: 66y/o male never smoker with history of DM, HTN, HLD, and GERD who was referred to Dr. Puente for an abnormal EKG (NSR with PVC's). He c/o FUENTES (CCS class 2 anginal equivalent) and epigastric pain. He denies palpitations, orthopnea, PND, edema or syncope/near syncope. He had a nuclear stress test during which he exercised for 6:23 and developed 1.5-2 mm horizontal ST depressions in inferolateral leads with inferior T wave inversions. 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    General: No fatigue, no fevers/chills  Respiratory: No dyspnea, no cough, no wheeze  CV: No chest pain, no palpitations  Abd: No nausea  Neuro: No headache, no dizziness    Objective:  Vital Signs Last 24 Hrs  T(C): 36.7 (02 Aug 2021 08:08), Max: 36.7 (02 Aug 2021 08:08)  T(F): 98 (02 Aug 2021 08:08), Max: 98 (02 Aug 2021 08:08)  HR: 77 (02 Aug 2021 08:08) (77 - 77)  BP: 183/91 (02 Aug 2021 08:08) (183/91 - 183/91)  RR: 18 (02 Aug 2021 08:08) (18 - 18)  SpO2: 99% (02 Aug 2021 08:08) (99% - 99%)    CM: SR  Neuro: A&OX3, CN 2-12 intact  HEENT: NC, AT  Lungs: CTA B/L  CV: S1, S2, no murmur, RRR  Abd: Soft  Extremity: Right radial band: no bleeding, fingers warm with good cap refil  EK.3   6.81  )-----------( 222      ( 02 Aug 2021 08:09 )             37.9     08-02    139  |  104  |  11.2  ----------------------------<  128  4.6   |  23.0  |  1.06    Ca    9.5      02 Aug 2021 08:09  Mg     2.0     08-02    TPro  7.5  /  Alb  4.6  /  TBili  0.5  /  DBili  x   /  AST  24  /  ALT  25  /  AlkPhos  38<L>  08-02       no

## 2025-04-29 NOTE — PHYSICAL THERAPY INITIAL EVALUATION ADULT - PREDICTED DURATION OF THERAPY (DAYS/WKS), PT EVAL
5-7 days Is There Documentation In The Chart Showing Discussion Of Changes With Another Physician?: Please Select the Appropriate Response